# Patient Record
Sex: MALE | Race: WHITE | NOT HISPANIC OR LATINO | Employment: FULL TIME | URBAN - NONMETROPOLITAN AREA
[De-identification: names, ages, dates, MRNs, and addresses within clinical notes are randomized per-mention and may not be internally consistent; named-entity substitution may affect disease eponyms.]

---

## 2021-09-17 ENCOUNTER — TRANSCRIBE ORDERS (OUTPATIENT)
Dept: ADMINISTRATIVE | Facility: HOSPITAL | Age: 41
End: 2021-09-17

## 2021-09-17 ENCOUNTER — HOSPITAL ENCOUNTER (OUTPATIENT)
Dept: RESPIRATORY THERAPY | Facility: HOSPITAL | Age: 41
Discharge: HOME | End: 2021-09-17
Admitting: NURSE PRACTITIONER

## 2021-09-17 DIAGNOSIS — R94.31 ABNORMAL EKG: Primary | ICD-10-CM

## 2021-09-17 DIAGNOSIS — R94.31 ABNORMAL EKG: ICD-10-CM

## 2021-09-17 PROCEDURE — 93225 XTRNL ECG REC<48 HRS REC: CPT

## 2021-09-29 PROCEDURE — 93227 XTRNL ECG REC<48 HR R&I: CPT | Performed by: INTERNAL MEDICINE

## 2021-10-14 ENCOUNTER — OFFICE VISIT (OUTPATIENT)
Dept: CARDIOLOGY | Facility: CLINIC | Age: 41
End: 2021-10-14

## 2021-10-14 ENCOUNTER — PATIENT ROUNDING (BHMG ONLY) (OUTPATIENT)
Dept: CARDIOLOGY | Facility: CLINIC | Age: 41
End: 2021-10-14

## 2021-10-14 VITALS
BODY MASS INDEX: 25.61 KG/M2 | TEMPERATURE: 97.3 F | HEIGHT: 64 IN | DIASTOLIC BLOOD PRESSURE: 88 MMHG | OXYGEN SATURATION: 98 % | WEIGHT: 150 LBS | SYSTOLIC BLOOD PRESSURE: 122 MMHG

## 2021-10-14 DIAGNOSIS — R01.1 HEART MURMUR: ICD-10-CM

## 2021-10-14 DIAGNOSIS — R00.2 PALPITATIONS: Primary | ICD-10-CM

## 2021-10-14 DIAGNOSIS — Z91.89 MULTIPLE RISK FACTORS FOR CORONARY ARTERY DISEASE: ICD-10-CM

## 2021-10-14 DIAGNOSIS — R07.9 CHEST PAIN IN ADULT: ICD-10-CM

## 2021-10-14 PROCEDURE — 93000 ELECTROCARDIOGRAM COMPLETE: CPT | Performed by: INTERNAL MEDICINE

## 2021-10-14 PROCEDURE — 99204 OFFICE O/P NEW MOD 45 MIN: CPT | Performed by: INTERNAL MEDICINE

## 2021-10-14 RX ORDER — MELOXICAM 15 MG/1
15 TABLET ORAL AS NEEDED
COMMUNITY
Start: 2021-09-16

## 2021-10-14 RX ORDER — ROSUVASTATIN CALCIUM 10 MG/1
10 TABLET, COATED ORAL DAILY
COMMUNITY
Start: 2021-07-13

## 2021-10-14 RX ORDER — LOSARTAN POTASSIUM 50 MG/1
50 TABLET ORAL DAILY
COMMUNITY
Start: 2021-09-17

## 2021-10-14 RX ORDER — GABAPENTIN 100 MG/1
100 CAPSULE ORAL DAILY
COMMUNITY
Start: 2021-09-16

## 2021-10-14 RX ORDER — MULTIPLE VITAMINS W/ MINERALS TAB 9MG-400MCG
TAB ORAL SEE ADMIN INSTRUCTIONS
COMMUNITY

## 2021-10-14 RX ORDER — METFORMIN HYDROCHLORIDE 500 MG/1
500 TABLET, EXTENDED RELEASE ORAL 2 TIMES DAILY
COMMUNITY
Start: 2021-08-30

## 2021-10-14 NOTE — PROGRESS NOTES
Subjective   Chief Complaint   Patient presents with   • Chest Pain     Patient states that he has been having chest pain with pain radating down the left arm.   • Abnormal ECG     per PCP   • Palpitations     At night time        History of Present Illness  Patient is 41 years old white male who describes himself in good health however few years ago he had neck injury because some heavy object fell on his neck.  He is to see a neurologist but he stopped seeing him.  He complains of left arm hurting and numb there is pain in the left axilla also some pain in the left anterior chest close to the clavicle.    Pain is not related to exertion it is sharp and fleeting pain  He describes himself as physically active and healthy and has no exertional related chest pain.  He discussed that with his physician an EKG was found to be abnormal with junctional rhythm.  He also had a Holter monitor which did not show any significant arrhythmia.    Sometimes he gets palpitations but they occur at night and not related to activity.    He has history of hypertension on losartan therapy  Diabetes mellitus on Metformin and hyperlipidemia on Crestor.  Patient does not smoke and does not drink.  There is no history of rheumatic fever or heart murmur.    Risk factors include  Male gender  Hypertension  Hyperlipidemia  Diabetes mellitus    History reviewed. No pertinent surgical history.  Family History   Problem Relation Age of Onset   • Hypertension Mother    • Heart disease Mother    • Diabetes Mother    • Hypertension Father    • Heart disease Father    • Diabetes Father    • Hypertension Brother      Past Medical History:   Diagnosis Date   • Abnormal ECG    • Diabetes mellitus (HCC)    • Heart murmur 10/14/2021   • Hyperlipidemia    • Hypertension        Patient Active Problem List   Diagnosis   • Chest pain in adult   • Palpitations   • Heart murmur         Social History     Tobacco Use   • Smoking status: Never Smoker   •  "Smokeless tobacco: Never Used   Substance Use Topics   • Alcohol use: Never   • Drug use: Not on file         The following portions of the patient's history were reviewed and updated as appropriate: allergies, current medications, past family history, past medical history, past social history, past surgical history and problem list.    Allergies   Allergen Reactions   • Penicillins Other (See Comments)     Per mother when you child         Current Outpatient Medications:   •  gabapentin (NEURONTIN) 100 MG capsule, Take 100 mg by mouth Daily., Disp: , Rfl:   •  losartan (COZAAR) 50 MG tablet, Take 50 mg by mouth Daily., Disp: , Rfl:   •  meloxicam (MOBIC) 15 MG tablet, Take 15 mg by mouth As Needed., Disp: , Rfl:   •  metFORMIN ER (GLUCOPHAGE-XR) 500 MG 24 hr tablet, Take 500 mg by mouth 2 (Two) Times a Day., Disp: , Rfl:   •  multivitamin with minerals (MENS ONE DAILY PO), See Admin Instructions., Disp: , Rfl:   •  rosuvastatin (CRESTOR) 10 MG tablet, Take 10 mg by mouth Daily., Disp: , Rfl:     Review of Systems   Cardiovascular: Positive for chest pain and palpitations.   Musculoskeletal:        Left arm pain        Objective      /88 (BP Location: Left arm, Patient Position: Sitting, Cuff Size: Adult)   Temp 97.3 °F (36.3 °C)   Ht 162.6 cm (64\")   Wt 68 kg (150 lb)   SpO2 98%   BMI 25.75 kg/m²     Pulmonary:      Effort: Pulmonary effort is normal.      Breath sounds: Normal breath sounds. No stridor. No wheezing. No rhonchi. No rales.   Cardiovascular:      PMI at left midclavicular line. Normal rate. Regular rhythm. Normal S1. Normal S2.      Murmurs: There is a grade 2/4 high frequency blowing decrescendo, early diastolic murmur at the URSB.      No gallop. No click. No rub.   Pulses:     Intact distal pulses.   Edema:     Peripheral edema absent.         Lab Review:        ECG 12 Lead    Date/Time: 10/14/2021 5:18 PM  Performed by: Cande Santos MD  Authorized by: Cande Santos, " MD   Comparison: not compared with previous ECG   Previous ECG: no previous ECG available  Rhythm: sinus rhythm  Rate: normal  BPM: 72  Conduction: conduction normal  ST Segments: ST segments normal  T Waves: T waves normal  QRS axis: normal    Clinical impression: normal ECG        There is an EKG faxed from PCP which appears to have junctional rhythm but fax quality is extremely poor.    Holter monitor shows periods of junctional rhythm    I reviewed the patient's new clinical results.  I personally viewed and interpreted the patient's EKG/lab data        Assessment:   Diagnosis Plan   1. Palpitations  Adult Transthoracic Echo Complete W/ Cont if Necessary Per Protocol    Treadmill Stress Test    ECG 12 Lead   2. Heart murmur  Adult Transthoracic Echo Complete W/ Cont if Necessary Per Protocol    ECG 12 Lead   3. Chest pain in adult  Treadmill Stress Test    ECG 12 Lead          Plan:  Patient is 41 years old white male who is here for cardiac evaluation because of palpitations and chest pain.  Chest pain is quite atypical and probably noncardiac    EKG today is normal however Holter monitor shows junctional rhythm but no significant arrhythmia.    Patient has multiple risk factors for coronary artery disease however I do not feel the patient has any significant coronary artery disease at this time.  Aggressive risk factor modification emphasized.  We will check treadmill stress test for evaluation of arrhythmia and for chest pain.    Echocardiogram was arranged for evaluation of heart murmur.  Patient does seem to have a subtle diastolic murmur at the aortic area.    Blood pressure appears to be very well controlled with losartan.  Lipid control is also good with Crestor.  Patient will be reevaluated after studies have been completed.      Thank you for giving me the oppertunity to participate in your patient's cardiac care.    Sincerely,    FIDEL Santos M.D. [facility]     No follow-ups on file.

## 2021-10-14 NOTE — PROGRESS NOTES
"October 14, 2021    Hello, may I speak with Lavelle Storey?    My name is Reji Grant      I am  with [facility]  [address] [place] [address] [phone].    Before we get started may I verify your date of birth? 1980    I am calling to officially welcome you to our practice and ask about your recent visit. Is this a good time to talk? yes    Tell me about your visit with us. What things went well?  \"I was pleased with my visit. I thought everything went great and I really liked Dr. Santos\".        We're always looking for ways to make our patients' experiences even better. Do you have recommendations on ways we may improve?  no    Overall were you satisfied with your first visit to our practice? yes       I appreciate you taking the time to speak with me today. Is there anything else I can do for you? no      Thank you, and have a great day.      "

## 2021-10-27 ENCOUNTER — PATIENT ROUNDING (BHMG ONLY) (OUTPATIENT)
Dept: CARDIOLOGY | Facility: CLINIC | Age: 41
End: 2021-10-27

## 2021-10-27 NOTE — PROGRESS NOTES
"October 27, 2021    Hello, may I speak with Lavelle Storey?    My name is Damari Russo.    I am  with [facility]  [facility]  [address] [place] [address] [phone].    Before we get started may I verify your date of birth? 1980    I am calling to officially welcome you to our practice and ask about your recent visit. Is this a good time to talk? yes    Tell me about your visit with us. What things went well?     \"Everything was fine.  It went good.\"       We're always looking for ways to make our patients' experiences even better. Do you have recommendations on ways we may improve?  no    Overall were you satisfied with your first visit to our practice? yes       I appreciate you taking the time to speak with me today. Is there anything else I can do for you? no      Thank you, and have a great day.      "

## 2021-10-28 ENCOUNTER — APPOINTMENT (OUTPATIENT)
Dept: CARDIOLOGY | Facility: HOSPITAL | Age: 41
End: 2021-10-28

## 2022-01-05 ENCOUNTER — HOSPITAL ENCOUNTER (OUTPATIENT)
Dept: CARDIOLOGY | Facility: HOSPITAL | Age: 42
Discharge: HOME | End: 2022-01-05

## 2022-01-05 DIAGNOSIS — R07.9 CHEST PAIN IN ADULT: ICD-10-CM

## 2022-01-05 DIAGNOSIS — R00.2 PALPITATIONS: ICD-10-CM

## 2022-01-05 DIAGNOSIS — R01.1 HEART MURMUR: ICD-10-CM

## 2022-01-05 PROCEDURE — 93306 TTE W/DOPPLER COMPLETE: CPT | Performed by: INTERNAL MEDICINE

## 2022-01-05 PROCEDURE — 93306 TTE W/DOPPLER COMPLETE: CPT

## 2022-01-05 PROCEDURE — 93018 CV STRESS TEST I&R ONLY: CPT | Performed by: INTERNAL MEDICINE

## 2022-01-05 PROCEDURE — 93017 CV STRESS TEST TRACING ONLY: CPT

## 2022-01-06 LAB
BH CV ECHO MEAS - AO ROOT AREA (BSA CORRECTED): 1.7
BH CV ECHO MEAS - AO ROOT AREA: 7.1 CM^2
BH CV ECHO MEAS - AO ROOT DIAM: 3 CM
BH CV ECHO MEAS - BSA(HAYCOCK): 1.8 M^2
BH CV ECHO MEAS - BSA: 1.7 M^2
BH CV ECHO MEAS - BZI_BMI: 25.7 KILOGRAMS/M^2
BH CV ECHO MEAS - BZI_METRIC_HEIGHT: 162.6 CM
BH CV ECHO MEAS - BZI_METRIC_WEIGHT: 68 KG
BH CV ECHO MEAS - EDV(CUBED): 74.1 ML
BH CV ECHO MEAS - EDV(MOD-SP4): 43.4 ML
BH CV ECHO MEAS - EDV(TEICH): 78.6 ML
BH CV ECHO MEAS - EF(CUBED): 67.1 %
BH CV ECHO MEAS - EF(MOD-SP4): 56.9 %
BH CV ECHO MEAS - EF(TEICH): 59 %
BH CV ECHO MEAS - ESV(CUBED): 24.4 ML
BH CV ECHO MEAS - ESV(MOD-SP4): 18.7 ML
BH CV ECHO MEAS - ESV(TEICH): 32.2 ML
BH CV ECHO MEAS - FS: 31 %
BH CV ECHO MEAS - IVS/LVPW: 0.92
BH CV ECHO MEAS - IVSD: 1.1 CM
BH CV ECHO MEAS - LA DIMENSION: 3.1 CM
BH CV ECHO MEAS - LA/AO: 1
BH CV ECHO MEAS - LV DIASTOLIC VOL/BSA (35-75): 25.1 ML/M^2
BH CV ECHO MEAS - LV MASS(C)D: 167.4 GRAMS
BH CV ECHO MEAS - LV MASS(C)DI: 96.7 GRAMS/M^2
BH CV ECHO MEAS - LV SYSTOLIC VOL/BSA (12-30): 10.8 ML/M^2
BH CV ECHO MEAS - LVIDD: 4.2 CM
BH CV ECHO MEAS - LVIDS: 2.9 CM
BH CV ECHO MEAS - LVLD AP4: 7 CM
BH CV ECHO MEAS - LVLS AP4: 5.7 CM
BH CV ECHO MEAS - LVOT AREA (M): 2.8 CM^2
BH CV ECHO MEAS - LVOT AREA: 2.8 CM^2
BH CV ECHO MEAS - LVOT DIAM: 1.9 CM
BH CV ECHO MEAS - LVPWD: 1.2 CM
BH CV ECHO MEAS - MV A MAX VEL: 70.7 CM/SEC
BH CV ECHO MEAS - MV E MAX VEL: 86.5 CM/SEC
BH CV ECHO MEAS - MV E/A: 1.2
BH CV ECHO MEAS - PA ACC TIME: 0.1 SEC
BH CV ECHO MEAS - PA PR(ACCEL): 34.5 MMHG
BH CV ECHO MEAS - RAP SYSTOLE: 10 MMHG
BH CV ECHO MEAS - RVSP: 23.8 MMHG
BH CV ECHO MEAS - SI(CUBED): 28.7 ML/M^2
BH CV ECHO MEAS - SI(MOD-SP4): 14.3 ML/M^2
BH CV ECHO MEAS - SI(TEICH): 26.8 ML/M^2
BH CV ECHO MEAS - SV(CUBED): 49.7 ML
BH CV ECHO MEAS - SV(MOD-SP4): 24.7 ML
BH CV ECHO MEAS - SV(TEICH): 46.4 ML
BH CV ECHO MEAS - TR MAX VEL: 186 CM/SEC
BH CV STRESS BP STAGE 1: NORMAL
BH CV STRESS BP STAGE 2: NORMAL
BH CV STRESS BP STAGE 3: NORMAL
BH CV STRESS DURATION MIN STAGE 1: 3
BH CV STRESS DURATION MIN STAGE 2: 3
BH CV STRESS DURATION MIN STAGE 3: 3
BH CV STRESS DURATION MIN STAGE 4: 0
BH CV STRESS DURATION SEC STAGE 1: 0
BH CV STRESS DURATION SEC STAGE 2: 0
BH CV STRESS DURATION SEC STAGE 3: 0
BH CV STRESS DURATION SEC STAGE 4: 17
BH CV STRESS GRADE STAGE 1: 10
BH CV STRESS GRADE STAGE 2: 12
BH CV STRESS GRADE STAGE 3: 14
BH CV STRESS GRADE STAGE 4: 16
BH CV STRESS HR STAGE 1: 118
BH CV STRESS HR STAGE 2: 132
BH CV STRESS HR STAGE 3: 155
BH CV STRESS HR STAGE 4: 160
BH CV STRESS METS STAGE 1: 5
BH CV STRESS METS STAGE 2: 7.5
BH CV STRESS METS STAGE 3: 10
BH CV STRESS METS STAGE 4: 13.5
BH CV STRESS PROTOCOL 1: NORMAL
BH CV STRESS RECOVERY BP: NORMAL MMHG
BH CV STRESS RECOVERY HR: 101 BPM
BH CV STRESS SPEED STAGE 1: 1.7
BH CV STRESS SPEED STAGE 2: 2.5
BH CV STRESS SPEED STAGE 3: 3.4
BH CV STRESS SPEED STAGE 4: 4.2
BH CV STRESS STAGE 1: 1
BH CV STRESS STAGE 2: 2
BH CV STRESS STAGE 3: 3
BH CV STRESS STAGE 4: 4
MAXIMAL PREDICTED HEART RATE: 181 BPM
MAXIMAL PREDICTED HEART RATE: 181 BPM
PERCENT MAX PREDICTED HR: 88.4 %
STRESS BASELINE BP: NORMAL MMHG
STRESS BASELINE HR: 85 BPM
STRESS PERCENT HR: 104 %
STRESS POST ESTIMATED WORKLOAD: 10.1 METS
STRESS POST EXERCISE DUR MIN: 9 MIN
STRESS POST EXERCISE DUR SEC: 17 SEC
STRESS POST PEAK BP: NORMAL MMHG
STRESS POST PEAK HR: 160 BPM
STRESS TARGET HR: 154 BPM
STRESS TARGET HR: 154 BPM

## 2022-01-12 ENCOUNTER — OFFICE VISIT (OUTPATIENT)
Dept: CARDIOLOGY | Facility: CLINIC | Age: 42
End: 2022-01-12

## 2022-01-12 VITALS
TEMPERATURE: 97.1 F | BODY MASS INDEX: 25.78 KG/M2 | SYSTOLIC BLOOD PRESSURE: 116 MMHG | OXYGEN SATURATION: 98 % | DIASTOLIC BLOOD PRESSURE: 82 MMHG | HEIGHT: 64 IN | WEIGHT: 151 LBS

## 2022-01-12 DIAGNOSIS — R00.2 PALPITATIONS: Primary | ICD-10-CM

## 2022-01-12 DIAGNOSIS — I10 PRIMARY HYPERTENSION: ICD-10-CM

## 2022-01-12 DIAGNOSIS — E78.2 MIXED HYPERLIPIDEMIA: ICD-10-CM

## 2022-01-12 DIAGNOSIS — Z91.89 MULTIPLE RISK FACTORS FOR CORONARY ARTERY DISEASE: ICD-10-CM

## 2022-01-12 PROCEDURE — 99214 OFFICE O/P EST MOD 30 MIN: CPT | Performed by: INTERNAL MEDICINE

## 2022-01-12 RX ORDER — METOPROLOL SUCCINATE 25 MG/1
25 TABLET, EXTENDED RELEASE ORAL DAILY
Qty: 90 TABLET | Refills: 1 | Status: SHIPPED | OUTPATIENT
Start: 2022-01-12 | End: 2022-06-07

## 2022-01-12 NOTE — PROGRESS NOTES
subjective     Chief Complaint   Patient presents with   • Palpitations     follow up   • Results     stress test and echo   • Rapid Heart Rate     patient states he has had two episodes along with dizziness since last visit     History of Present Illness  Patient is 41 years old white male who states that he has been having a lot of palpitations, heartbeats fast but stays regular.  he had a Holter monitor done a few months ago which showed.  Of sinus tachycardia but no A. fib or SVT.    Stress test was negative for significant exercise-induced myocardial ischemia and echo was normal.  Patient was discussed the results.    Hyperlipidemia on Crestor therapy    Hypertension on losartan  Hyperglycemia on metformin therapy.  Patient has multiple risk factors for coronary artery disease but denies any cardiac symptoms except for fast heartbeat.    History reviewed. No pertinent surgical history.  Family History   Problem Relation Age of Onset   • Hypertension Mother    • Heart disease Mother    • Diabetes Mother    • Hypertension Father    • Heart disease Father    • Diabetes Father    • Hypertension Brother      Past Medical History:   Diagnosis Date   • Abnormal ECG    • Diabetes mellitus (HCC)    • Heart murmur 10/14/2021   • Hyperlipidemia    • Hypertension      Patient Active Problem List   Diagnosis   • Chest pain in adult   • Palpitations   • Heart murmur   • Multiple risk factors for coronary artery disease, hyperlipidemia, hypertension, diabetes mellitus   • Mixed hyperlipidemia   • Primary hypertension       Social History     Tobacco Use   • Smoking status: Never Smoker   • Smokeless tobacco: Never Used   Substance Use Topics   • Alcohol use: Never   • Drug use: Not on file       Allergies   Allergen Reactions   • Penicillins Other (See Comments)     Per mother when you child       Current Outpatient Medications on File Prior to Visit   Medication Sig   • gabapentin (NEURONTIN) 100 MG capsule Take 100 mg by  "mouth Daily.   • losartan (COZAAR) 50 MG tablet Take 50 mg by mouth Daily.   • meloxicam (MOBIC) 15 MG tablet Take 15 mg by mouth As Needed.   • metFORMIN ER (GLUCOPHAGE-XR) 500 MG 24 hr tablet Take 500 mg by mouth 2 (Two) Times a Day.   • multivitamin with minerals (MENS ONE DAILY PO) See Admin Instructions.   • rosuvastatin (CRESTOR) 10 MG tablet Take 10 mg by mouth Daily.     No current facility-administered medications on file prior to visit.         The following portions of the patient's history were reviewed and updated as appropriate: allergies, current medications, past family history, past medical history, past social history, past surgical history and problem list.    Review of Systems   Constitutional: Negative.   HENT: Negative.  Negative for congestion.    Eyes: Negative.    Cardiovascular: Positive for palpitations. Negative for chest pain, cyanosis, dyspnea on exertion, irregular heartbeat, leg swelling, near-syncope, orthopnea, paroxysmal nocturnal dyspnea and syncope.   Respiratory: Negative.  Negative for shortness of breath.    Hematologic/Lymphatic: Negative.    Musculoskeletal: Negative.    Gastrointestinal: Negative.    Neurological: Negative.  Negative for headaches.          Objective:     /82 (BP Location: Left arm, Patient Position: Sitting, Cuff Size: Adult)   Temp 97.1 °F (36.2 °C)   Ht 162.6 cm (64\")   Wt 68.5 kg (151 lb)   SpO2 98%   BMI 25.92 kg/m²   Pulmonary:      Effort: Pulmonary effort is normal.      Breath sounds: Normal breath sounds. No stridor. No wheezing. No rhonchi. No rales.   Cardiovascular:      PMI at left midclavicular line. Normal rate. Regular rhythm. Normal S1. Normal S2.      Murmurs: There is no murmur.      No gallop. No click. No rub.   Pulses:     Intact distal pulses.   Edema:     Peripheral edema absent.           Lab Review  No lab work this visit.  Procedures  Interpretation Summary treadmill stress test    · A stress test was performed " following the Catalino protocol.  · Resting EKG showed regular sinus rhythm rate of 85 bpm, nonspecific T wave changes were noted.  · Patient walked 9:17 on treadmill reaching target heart rate, patient denied any chest discomfort  · Blood pressure demonstrated a normal response to stress. Heart rate demonstrated a normal response to stress.  · ST segments did not show any diagnostic changes. No significant arrhythmia detected.  · The Duke Treadmill Score of 9.28 is consistent with a Low risk for ischemic heart disease.  · Stress test is felt to be negative for significant exercise-induced myocardial ischemia. Probability of significant coronary artery disease is low.    Interpretation Summary echocardiogram    · Normal left ventricular cavity size and wall thickness noted.  · Left ventricular ejection fraction appears to be 56 - 60%. Left ventricular systolic function is normal.  · Left ventricular diastolic function was normal.  · No significant valvular heart disease noted.  · No pericardial effusion detected.           I personally viewed and interpreted the patient's LAB data         Assessment:     1. Palpitations    2. Multiple risk factors for coronary artery disease, hyperlipidemia, hypertension, diabetes mellitus    3. Mixed hyperlipidemia    4. Primary hypertension          Plan:     Patient complains of palpitations but there is no evidence of atrial fibrillation or SVT.  He was advised to start Toprol-XL 25 mg daily  He has a history of hypertension and is taking losartan 50 mg daily blood pressure is 110 systolic.  He was advised to decrease losartan to 25 mg daily initially but after metoprolol initiation he can go back to losartan 50 as long as blood pressure is over 130 systolic.  Goal of blood pressure between 1 20-1 40 systolic discussed with the patient.    He also has hyperlipidemia and hyperglycemia with multiple risk factors for coronary artery disease.  Risk factor modification stressed.   Currently stress test and echocardiogram are both normal.  Follow-up in 6 months        No follow-ups on file.

## 2022-06-07 RX ORDER — METOPROLOL SUCCINATE 25 MG/1
TABLET, EXTENDED RELEASE ORAL
Qty: 90 TABLET | Refills: 0 | Status: SHIPPED | OUTPATIENT
Start: 2022-06-07

## 2022-06-08 ENCOUNTER — TRANSCRIBE ORDERS (OUTPATIENT)
Dept: ADMINISTRATIVE | Facility: HOSPITAL | Age: 42
End: 2022-06-08

## 2022-06-08 DIAGNOSIS — R19.09 GROIN SWELLING: Primary | ICD-10-CM

## 2022-06-18 ENCOUNTER — HOSPITAL ENCOUNTER (OUTPATIENT)
Dept: CT IMAGING | Facility: HOSPITAL | Age: 42
Discharge: HOME | End: 2022-06-18
Admitting: NURSE PRACTITIONER

## 2022-06-18 DIAGNOSIS — R19.09 GROIN SWELLING: ICD-10-CM

## 2022-06-18 PROCEDURE — 74160 CT ABDOMEN W/CONTRAST: CPT | Performed by: RADIOLOGY

## 2022-06-18 PROCEDURE — 25010000002 IOPAMIDOL 61 % SOLUTION: Performed by: NURSE PRACTITIONER

## 2022-06-18 PROCEDURE — 82565 ASSAY OF CREATININE: CPT

## 2022-06-18 PROCEDURE — 74160 CT ABDOMEN W/CONTRAST: CPT

## 2022-06-18 RX ADMIN — IOPAMIDOL 80 ML: 612 INJECTION, SOLUTION INTRAVENOUS at 14:59

## 2022-07-05 LAB — CREAT BLDA-MCNC: 0.6 MG/DL (ref 0.6–1.3)

## 2023-10-20 ENCOUNTER — TRANSCRIBE ORDERS (OUTPATIENT)
Dept: ADMINISTRATIVE | Facility: HOSPITAL | Age: 43
End: 2023-10-20
Payer: COMMERCIAL

## 2023-10-20 DIAGNOSIS — N20.0 KIDNEY STONE: Primary | ICD-10-CM

## 2023-11-08 ENCOUNTER — HOSPITAL ENCOUNTER (OUTPATIENT)
Dept: GENERAL RADIOLOGY | Facility: HOSPITAL | Age: 43
Discharge: HOME | End: 2023-11-08
Admitting: NURSE PRACTITIONER
Payer: COMMERCIAL

## 2023-11-08 ENCOUNTER — OFFICE VISIT (OUTPATIENT)
Dept: UROLOGY | Facility: CLINIC | Age: 43
End: 2023-11-08
Payer: COMMERCIAL

## 2023-11-08 VITALS
WEIGHT: 143.2 LBS | BODY MASS INDEX: 24.45 KG/M2 | SYSTOLIC BLOOD PRESSURE: 126 MMHG | HEART RATE: 88 BPM | HEIGHT: 64 IN | DIASTOLIC BLOOD PRESSURE: 87 MMHG

## 2023-11-08 DIAGNOSIS — R35.0 FREQUENCY OF MICTURITION: ICD-10-CM

## 2023-11-08 DIAGNOSIS — K59.04 CHRONIC IDIOPATHIC CONSTIPATION: ICD-10-CM

## 2023-11-08 DIAGNOSIS — R10.30 LOWER ABDOMINAL PAIN: ICD-10-CM

## 2023-11-08 DIAGNOSIS — N20.1 LEFT URETERAL STONE: Primary | ICD-10-CM

## 2023-11-08 DIAGNOSIS — R10.9 LEFT FLANK PAIN: ICD-10-CM

## 2023-11-08 LAB
BILIRUB BLD-MCNC: NEGATIVE MG/DL
CLARITY, POC: CLEAR
COLOR UR: YELLOW
EXPIRATION DATE: ABNORMAL
GLUCOSE UR STRIP-MCNC: NEGATIVE MG/DL
KETONES UR QL: NEGATIVE
LEUKOCYTE EST, POC: NEGATIVE
Lab: ABNORMAL
NITRITE UR-MCNC: NEGATIVE MG/ML
PH UR: 6 [PH] (ref 5–8)
PROT UR STRIP-MCNC: NEGATIVE MG/DL
RBC # UR STRIP: NEGATIVE /UL
SP GR UR: 1 (ref 1–1.03)
UROBILINOGEN UR QL: NORMAL

## 2023-11-08 PROCEDURE — 74018 RADEX ABDOMEN 1 VIEW: CPT

## 2023-11-08 PROCEDURE — 81003 URINALYSIS AUTO W/O SCOPE: CPT | Performed by: NURSE PRACTITIONER

## 2023-11-08 PROCEDURE — 74018 RADEX ABDOMEN 1 VIEW: CPT | Performed by: RADIOLOGY

## 2023-11-08 PROCEDURE — 99204 OFFICE O/P NEW MOD 45 MIN: CPT | Performed by: NURSE PRACTITIONER

## 2023-11-08 RX ORDER — HYDROCODONE BITARTRATE AND ACETAMINOPHEN 5; 325 MG/1; MG/1
2 TABLET ORAL EVERY 4 HOURS PRN
COMMUNITY

## 2023-11-08 RX ORDER — TAMSULOSIN HYDROCHLORIDE 0.4 MG/1
1 CAPSULE ORAL EVERY 24 HOURS
COMMUNITY
Start: 2023-10-20 | End: 2023-11-08 | Stop reason: SDUPTHER

## 2023-11-08 RX ORDER — GENTAMICIN SULFATE 80 MG/100ML
80 INJECTION, SOLUTION INTRAVENOUS ONCE
Status: CANCELLED | OUTPATIENT
Start: 2023-11-08 | End: 2023-11-08

## 2023-11-08 RX ORDER — TESTOSTERONE CYPIONATE 200 MG/ML
200 INJECTION, SOLUTION INTRAMUSCULAR
COMMUNITY
Start: 2023-10-28

## 2023-11-08 RX ORDER — KETOROLAC TROMETHAMINE 10 MG/1
10 TABLET, FILM COATED ORAL EVERY 6 HOURS PRN
COMMUNITY
Start: 2023-11-06

## 2023-11-08 RX ORDER — TAMSULOSIN HYDROCHLORIDE 0.4 MG/1
1 CAPSULE ORAL EVERY 24 HOURS
Qty: 30 CAPSULE | Refills: 1 | Status: SHIPPED | OUTPATIENT
Start: 2023-11-08

## 2023-11-08 NOTE — PROGRESS NOTES
Chief Complaint  LEFT RENAL STONE/FLANK PAIN (NEW PATIENT W.LEFT 4 MM DISTAL UVJ STONE)    Raul Storey presents to [facility] for FLANK PAIN/RENAL STONE  History of Present Illness    Mr. Lavelle Storey is a pleasant 43-year-old male with a significant history of recurrent kidney stones who returns to clinic today for evaluation as a new patient consult. The patient has been referred to clinic by PCP for kidney stones. He is also an ER follow-up. He reports he presented to [facility] ED on 11/7/2023 secondary to bilateral flank pain radiating to left lower back, left lower quadrant of his abdomen causing significant pelvic pressure and suprapubic discomfort. Patient did report his pain as colicky in nature, initially intermittent but had become sharp, constant causing nausea without vomiting, urinary symptoms, frequency, urgency and dysuria. He did have some bouts of hematuria,     AT the ER for evaluation, had a CT abdomen and pelvis completed which I have reviewed showing a 4 mm left distal ureteral stone, question mild to moderate hydronephrosis and hydroureter.    On initial evaluation in clinic today the patient is in apparent discomfort.  The patient is accompanied by his wife, Rosa. He has been experiencing left flank pain for approximately 3 weeks. He denies passing any stones. The emergency room informed him he had a 4 mm stone in his lower kidney. He does not have a gastroenterologist, but he was seen for a hernia. He has not had his hernia fixed. He denies any muscle pull or strain on his back. He had a back injury in the past. His pain is 3 or 4 out of 10.    His repeat KUB today 11/08/2023 is remarkable :IMPRESSION:  1.  Calcification in the left lower pelvis may represent distal ureter stone and is approximately 4.5 mm.  2. No additional renal or ureteral stones radiographically evident.  3.  Moderate to large volume colonic  "stool    CT IMPRESSION 11/07/23  4 x 3 mm left  distal ureteral stone with mild left hydroureteronephrosis.  No bilateral renal stones noted or right ureteral stone evident    Active Ambulatory Problems     Diagnosis Date Noted    Chest pain in adult 10/14/2021    Palpitations 10/14/2021    Heart murmur 10/14/2021    Multiple risk factors for coronary artery disease, hyperlipidemia, hypertension, diabetes mellitus 10/14/2021    Mixed hyperlipidemia 01/12/2022    Primary hypertension 01/12/2022    Left ureteral stone 11/08/2023    Left flank pain 11/08/2023    Lower abdominal pain 11/08/2023     Resolved Ambulatory Problems     Diagnosis Date Noted    No Resolved Ambulatory Problems     Past Medical History:   Diagnosis Date    Abnormal ECG     Diabetes mellitus     Hyperlipidemia     Hypertension       Objective   Vital Signs:   /87   Pulse 88   Ht 162.6 cm (64.02\")   Wt 65 kg (143 lb 3.2 oz)   BMI 24.57 kg/m²       ROS:   Review of Systems   Constitutional:  Positive for activity change, appetite change and fatigue. Negative for chills, diaphoresis, fever, unexpected weight gain and unexpected weight loss.   HENT: Negative.  Negative for congestion, ear discharge, ear pain, nosebleeds, rhinorrhea, sinus pressure and sore throat.    Eyes: Negative.  Negative for blurred vision, double vision, photophobia, pain, redness and visual disturbance.   Respiratory: Negative.  Negative for apnea, cough, chest tightness, shortness of breath, wheezing and stridor.    Cardiovascular: Negative.  Negative for chest pain and palpitations.   Gastrointestinal:  Positive for abdominal distention, abdominal pain, constipation and nausea. Negative for diarrhea and vomiting.   Endocrine: Negative.  Negative for polydipsia, polyphagia and polyuria.   Genitourinary:  Positive for difficulty urinating, dysuria, flank pain, frequency, hematuria, nocturia and urgency. Negative for decreased urine volume, discharge, genital sores, " penile pain, penile swelling, scrotal swelling, testicular pain and urinary incontinence.   Musculoskeletal:  Positive for back pain. Negative for arthralgias and joint swelling.   Skin:  Positive for color change and dry skin. Negative for pallor, rash and wound.   Allergic/Immunologic: Negative.    Neurological: Negative.  Negative for dizziness, tremors, syncope, weakness, light-headedness, memory problem and confusion.   Hematological: Negative.    Psychiatric/Behavioral:  Positive for sleep disturbance and stress. Negative for agitation, behavioral problems and decreased concentration.         Physical Exam  Constitutional:       General: He is in acute distress.      Appearance: He is well-developed.   HENT:      Head: Normocephalic and atraumatic.      Right Ear: External ear normal.      Left Ear: External ear normal.   Eyes:      General:         Right eye: No discharge.         Left eye: No discharge.      Conjunctiva/sclera: Conjunctivae normal.      Pupils: Pupils are equal, round, and reactive to light.   Neck:      Thyroid: No thyromegaly.      Trachea: No tracheal deviation.   Cardiovascular:      Rate and Rhythm: Normal rate and regular rhythm.      Heart sounds: No murmur heard.     No friction rub.   Pulmonary:      Effort: Pulmonary effort is normal. No respiratory distress.      Breath sounds: Normal breath sounds. No stridor.   Abdominal:      General: Bowel sounds are normal. There is distension.      Palpations: Abdomen is soft.      Tenderness: There is abdominal tenderness. There is guarding (LLQ/LEFT FLANK).   Genitourinary:     Penis: Normal and uncircumcised. No tenderness or discharge.       Testes: Normal.      Rectum: Normal. Guaiac result negative.      Comments: DYSURIA/FREQUENCY    Musculoskeletal:         General: Tenderness present. No deformity. Normal range of motion.      Cervical back: Normal range of motion and neck supple.   Skin:     General: Skin is warm and dry.       Capillary Refill: Capillary refill takes less than 2 seconds.      Coloration: Skin is pale.   Neurological:      Mental Status: He is alert and oriented to person, place, and time.      Cranial Nerves: No cranial nerve deficit.      Motor: Weakness present.      Coordination: Coordination normal.   Psychiatric:         Behavior: Behavior normal.         Thought Content: Thought content normal.         Judgment: Judgment normal.        Result Review :              Assessment and Plan    Problem List Items Addressed This Visit          Gastrointestinal Abdominal     Left flank pain    Relevant Medications    linaclotide (Linzess) 72 MCG capsule capsule    Other Relevant Orders    XR abdomen kub (Completed)    Case Request (Completed)    Lower abdominal pain    Relevant Medications    linaclotide (Linzess) 72 MCG capsule capsule    tamsulosin (FLOMAX) 0.4 MG capsule 24 hr capsule    Other Relevant Orders    Case Request (Completed)       Genitourinary and Reproductive     Left ureteral stone - Primary    Relevant Medications    tamsulosin (FLOMAX) 0.4 MG capsule 24 hr capsule    Other Relevant Orders    XR abdomen kub (Completed)    Case Request (Completed)     Other Visit Diagnoses       Frequency of micturition        Relevant Orders    POC Urinalysis Dipstick, Automated (Completed)    Chronic idiopathic constipation        Relevant Medications    linaclotide (Linzess) 72 MCG capsule capsule           ASSESSMENT     Left Flank/LLQ ABDOMINAL Pain / Left Ureteral Calculus/IBC-C:   Mr. Lavelle Storey is a pleasant 43-year-old male with a significant history of recurrent kidney stones who presents to clinic today for evaluation secondary to 4.5 mm left distal ureteral stone, causing moderate hydronephrosis and hydroureter.  On initial evaluation in clinic today he is in apparent discomfort reports persistent left flank/left lower quadrant abdominal pain that has been constant, with increased difficulty maintaining any  comfortable sitting or lying position.  He is unable to give us a urine for analysis today but reports dysuria, frequency, and constant feeling of incomplete bladder emptying. REPEAT KUB/CT scan is remarkable for 4.5 mm left distal ureteral stone.    The Patient has been diagnosed with a  4mm left ureteral calculus.. He has some discomfort and describes her pain as colicky,AND INtolerable at times. We have discussed the various parameters regarding spontaneous passage including the notion that a tiny 1-4 mm stone has a 95% high likelihood of spontaneous passage versus a larger stone being caught up in the upper areas of the urinary tract.  We also discussed the medical management of stone disease and the use of medical expulsive therapy in the form of Flomax. This is used in an off label setting. I also talked about nonoperative management including ambulation and increasing fluids to atleast 2-3L,  and hot tubs as being an effective adjuncts in the treatment of a ureteral stone. We discussed the absolute relative indicators for intervention including the presence of sepsis, and pain we cannot control as the primary need for urgent intervention.  We discussed placement of a stent if indicated and the management of the stent as well.    IBS- FINALLY, Patient has significant irritable bowel syndrome, characterized by extreme amounts of constipation, most likely narcotic pain control induced from his kidney stones.  We spoke about the impact of this on bladder function, and the flank pain, left lower quadrant abdominal pain he is experiencing.  We discussed the need for increasing p.o. fluid intake to at least 2 to 3 L of water daily, and discussed the physiology of colonic motility as well as use of MiraLAX as a bulk laxative versus the newer class of serotonin uptake blockers such as Linzess.  We stressed the need for a daily bowel movement and  We also discussed a referral to the GI nurse practitioner if his  constipation persist beyond his kidney stones.     PLAN  Patient has been scheduled for LEFT URETEROSCOPY LASER LITHOTRIPSY WITH POSSIBLE LEFT STENT ON MONDAY 11/13/2023    Patient has adequate Narcotic pain medication FROM ED HYDRO FOR SEVERE PAIN ,  just in case and Nausea medication and Flomax for medical expulsive therapy.      He is to use Toradol 10 as needed for breakthrough pain OR MELOXICAM PRN    LINZESS 72 MG SAMPLES GIVEN TO TAKE FOR SEVERE CONSTIPATION     We discussed OTHER  treatment options for HIS BACK/FLANK pain with patient encouraged to continue conservative therapy alternating NSAID/Tylenol as tolerated, Also including hot baths, showers, warm compresses to lower back as tolerated. Also encouraged walking, physical therapy, light exercises as tolerated    Rest is the most important treatment in the case of BACK/FLANK pain. Rest and physical therapy are enough to improve minor pain. Discussed to monitor his daily routine with prevention of flank pain by: At least Drinking 8 glasses of water per day, Limiting your alcohol consumption.  Having a healthy diet containing fruits, vegetables, and a lot of fluids, Practicing safe sex.  Also maintaining proper hygiene of your body as well as the environment.    Discussed a kidney stone prevention diet to include increasing p.o. fluid intake, to at least 1 to 2 L of water daily.  She is to avoid caffeine products such as cola, coffee, and to avoid soft or soda drinks.  She is to decrease her sodium consumption as in  Fast foods, menezes, salted nuts, canned foods, and smoked/cured foods. She is also to decrease her oxalate consumption, as in spinach, Roselia greens, and Rhubarb.  Also important is to decrease protein intake, as in red meat/s, peanut butter, and also avoid nuts.    I will follow up with patient post procedure    HE MAY RETURN SOONER IF NEED BE,    OR GO TO ER IF WORSENING SYMPTOMS    PATIENT WIFE AGREEABLE WITH PLAN OF CARE    Patient  reports that he is not currently experiencing any symptoms of urinary incontinence.    BMI is within normal parameters. No other follow-up for BMI required.    RADIOLOGY (CT AND/OR KUB):    CT Abdomen and Pelvis: No results found for this or any previous visit.     CT Stone Protocol: No results found for this or any previous visit.     KUB: No results found for this or any previous visit.       LABS (3 MONTHS):    Office Visit on 11/08/2023   Component Date Value Ref Range Status    Color 11/08/2023 Yellow  Yellow, Straw, Dark Yellow, Pilar Final    Clarity, UA 11/08/2023 Clear  Clear Final    Specific Gravity  11/08/2023 1.000 (A)  1.005 - 1.030 Final    pH, Urine 11/08/2023 6.0  5.0 - 8.0 Final    Leukocytes 11/08/2023 Negative  Negative Final    Nitrite, UA 11/08/2023 Negative  Negative Final    Protein, POC 11/08/2023 Negative  Negative mg/dL Final    Glucose, UA 11/08/2023 Negative  Negative mg/dL Final    Ketones, UA 11/08/2023 Negative  Negative Final    Urobilinogen, UA 11/08/2023 Normal  Normal, 0.2 E.U./dL Final    Bilirubin 11/08/2023 Negative  Negative Final    Blood, UA 11/08/2023 Negative  Negative Final    Lot Number 11/08/2023 [id number]   Final    Expiration Date 11/08/2023 10/25/2024   Final        Smoking Cessation Counseling:  Never a smoker.  Patient does not currently use any tobacco products.     Follow Up   Return in about 5 days (around 11/13/2023) for Next scheduled follow up, With Dr. Eldridge, Cystoscopy IN OR -URETEROSCOPY LASER LITHO FOR  4MM UVJ .    Patient was given instructions and counseling regarding his condition or for health maintenance advice. Please see specific information pulled into the AVS if appropriate.          This document has been electronically signed by Griselda Cheng-Akwa, APRN   November 8, 2023 17:42 EST      Dictated Utilizing Dragon Dictation: Part of this note may be an electronic transcription/translation of spoken language to printed text using the  Dragon Dictation System.     Transcribed from ambient dictation for Griselda Cheng-Akwa, APRN by Inga Monique.  11/08/23   12:38 EST    Patient or patient representative verbalized consent to the visit recording.  I have personally performed the services described in this document as transcribed by the above individual, and it is both accurate and complete.

## 2023-11-08 NOTE — H&P (VIEW-ONLY)
Chief Complaint  LEFT RENAL STONE/FLANK PAIN (NEW PATIENT W.LEFT 4 MM DISTAL UVJ STONE)    Raul Storey presents to [facility] for FLANK PAIN/RENAL STONE  History of Present Illness    Mr. Lavelle Storey is a pleasant 43-year-old male with a significant history of recurrent kidney stones who returns to clinic today for evaluation as a new patient consult. The patient has been referred to clinic by PCP for kidney stones. He is also an ER follow-up. He reports he presented to [facility] ED on 11/7/2023 secondary to bilateral flank pain radiating to left lower back, left lower quadrant of his abdomen causing significant pelvic pressure and suprapubic discomfort. Patient did report his pain as colicky in nature, initially intermittent but had become sharp, constant causing nausea without vomiting, urinary symptoms, frequency, urgency and dysuria. He did have some bouts of hematuria,     AT the ER for evaluation, had a CT abdomen and pelvis completed which I have reviewed showing a 4 mm left distal ureteral stone, question mild to moderate hydronephrosis and hydroureter.    On initial evaluation in clinic today the patient is in apparent discomfort.  The patient is accompanied by his wife, Rosa. He has been experiencing left flank pain for approximately 3 weeks. He denies passing any stones. The emergency room informed him he had a 4 mm stone in his lower kidney. He does not have a gastroenterologist, but he was seen for a hernia. He has not had his hernia fixed. He denies any muscle pull or strain on his back. He had a back injury in the past. His pain is 3 or 4 out of 10.    His repeat KUB today 11/08/2023 is remarkable :IMPRESSION:  1.  Calcification in the left lower pelvis may represent distal ureter stone and is approximately 4.5 mm.  2. No additional renal or ureteral stones radiographically evident.  3.  Moderate to large volume colonic  "stool    CT IMPRESSION 11/07/23  4 x 3 mm left  distal ureteral stone with mild left hydroureteronephrosis.  No bilateral renal stones noted or right ureteral stone evident    Active Ambulatory Problems     Diagnosis Date Noted    Chest pain in adult 10/14/2021    Palpitations 10/14/2021    Heart murmur 10/14/2021    Multiple risk factors for coronary artery disease, hyperlipidemia, hypertension, diabetes mellitus 10/14/2021    Mixed hyperlipidemia 01/12/2022    Primary hypertension 01/12/2022    Left ureteral stone 11/08/2023    Left flank pain 11/08/2023    Lower abdominal pain 11/08/2023     Resolved Ambulatory Problems     Diagnosis Date Noted    No Resolved Ambulatory Problems     Past Medical History:   Diagnosis Date    Abnormal ECG     Diabetes mellitus     Hyperlipidemia     Hypertension       Objective   Vital Signs:   /87   Pulse 88   Ht 162.6 cm (64.02\")   Wt 65 kg (143 lb 3.2 oz)   BMI 24.57 kg/m²       ROS:   Review of Systems   Constitutional:  Positive for activity change, appetite change and fatigue. Negative for chills, diaphoresis, fever, unexpected weight gain and unexpected weight loss.   HENT: Negative.  Negative for congestion, ear discharge, ear pain, nosebleeds, rhinorrhea, sinus pressure and sore throat.    Eyes: Negative.  Negative for blurred vision, double vision, photophobia, pain, redness and visual disturbance.   Respiratory: Negative.  Negative for apnea, cough, chest tightness, shortness of breath, wheezing and stridor.    Cardiovascular: Negative.  Negative for chest pain and palpitations.   Gastrointestinal:  Positive for abdominal distention, abdominal pain, constipation and nausea. Negative for diarrhea and vomiting.   Endocrine: Negative.  Negative for polydipsia, polyphagia and polyuria.   Genitourinary:  Positive for difficulty urinating, dysuria, flank pain, frequency, hematuria, nocturia and urgency. Negative for decreased urine volume, discharge, genital sores, " penile pain, penile swelling, scrotal swelling, testicular pain and urinary incontinence.   Musculoskeletal:  Positive for back pain. Negative for arthralgias and joint swelling.   Skin:  Positive for color change and dry skin. Negative for pallor, rash and wound.   Allergic/Immunologic: Negative.    Neurological: Negative.  Negative for dizziness, tremors, syncope, weakness, light-headedness, memory problem and confusion.   Hematological: Negative.    Psychiatric/Behavioral:  Positive for sleep disturbance and stress. Negative for agitation, behavioral problems and decreased concentration.         Physical Exam  Constitutional:       General: He is in acute distress.      Appearance: He is well-developed.   HENT:      Head: Normocephalic and atraumatic.      Right Ear: External ear normal.      Left Ear: External ear normal.   Eyes:      General:         Right eye: No discharge.         Left eye: No discharge.      Conjunctiva/sclera: Conjunctivae normal.      Pupils: Pupils are equal, round, and reactive to light.   Neck:      Thyroid: No thyromegaly.      Trachea: No tracheal deviation.   Cardiovascular:      Rate and Rhythm: Normal rate and regular rhythm.      Heart sounds: No murmur heard.     No friction rub.   Pulmonary:      Effort: Pulmonary effort is normal. No respiratory distress.      Breath sounds: Normal breath sounds. No stridor.   Abdominal:      General: Bowel sounds are normal. There is distension.      Palpations: Abdomen is soft.      Tenderness: There is abdominal tenderness. There is guarding (LLQ/LEFT FLANK).   Genitourinary:     Penis: Normal and uncircumcised. No tenderness or discharge.       Testes: Normal.      Rectum: Normal. Guaiac result negative.      Comments: DYSURIA/FREQUENCY    Musculoskeletal:         General: Tenderness present. No deformity. Normal range of motion.      Cervical back: Normal range of motion and neck supple.   Skin:     General: Skin is warm and dry.       Capillary Refill: Capillary refill takes less than 2 seconds.      Coloration: Skin is pale.   Neurological:      Mental Status: He is alert and oriented to person, place, and time.      Cranial Nerves: No cranial nerve deficit.      Motor: Weakness present.      Coordination: Coordination normal.   Psychiatric:         Behavior: Behavior normal.         Thought Content: Thought content normal.         Judgment: Judgment normal.        Result Review :              Assessment and Plan    Problem List Items Addressed This Visit          Gastrointestinal Abdominal     Left flank pain    Relevant Medications    linaclotide (Linzess) 72 MCG capsule capsule    Other Relevant Orders    XR abdomen kub (Completed)    Case Request (Completed)    Lower abdominal pain    Relevant Medications    linaclotide (Linzess) 72 MCG capsule capsule    tamsulosin (FLOMAX) 0.4 MG capsule 24 hr capsule    Other Relevant Orders    Case Request (Completed)       Genitourinary and Reproductive     Left ureteral stone - Primary    Relevant Medications    tamsulosin (FLOMAX) 0.4 MG capsule 24 hr capsule    Other Relevant Orders    XR abdomen kub (Completed)    Case Request (Completed)     Other Visit Diagnoses       Frequency of micturition        Relevant Orders    POC Urinalysis Dipstick, Automated (Completed)    Chronic idiopathic constipation        Relevant Medications    linaclotide (Linzess) 72 MCG capsule capsule           ASSESSMENT     Left Flank/LLQ ABDOMINAL Pain / Left Ureteral Calculus/IBC-C:   Mr. Lavelle Storey is a pleasant 43-year-old male with a significant history of recurrent kidney stones who presents to clinic today for evaluation secondary to 4.5 mm left distal ureteral stone, causing moderate hydronephrosis and hydroureter.  On initial evaluation in clinic today he is in apparent discomfort reports persistent left flank/left lower quadrant abdominal pain that has been constant, with increased difficulty maintaining any  comfortable sitting or lying position.  He is unable to give us a urine for analysis today but reports dysuria, frequency, and constant feeling of incomplete bladder emptying. REPEAT KUB/CT scan is remarkable for 4.5 mm left distal ureteral stone.    The Patient has been diagnosed with a  4mm left ureteral calculus.. He has some discomfort and describes her pain as colicky,AND INtolerable at times. We have discussed the various parameters regarding spontaneous passage including the notion that a tiny 1-4 mm stone has a 95% high likelihood of spontaneous passage versus a larger stone being caught up in the upper areas of the urinary tract.  We also discussed the medical management of stone disease and the use of medical expulsive therapy in the form of Flomax. This is used in an off label setting. I also talked about nonoperative management including ambulation and increasing fluids to atleast 2-3L,  and hot tubs as being an effective adjuncts in the treatment of a ureteral stone. We discussed the absolute relative indicators for intervention including the presence of sepsis, and pain we cannot control as the primary need for urgent intervention.  We discussed placement of a stent if indicated and the management of the stent as well.    IBS- FINALLY, Patient has significant irritable bowel syndrome, characterized by extreme amounts of constipation, most likely narcotic pain control induced from his kidney stones.  We spoke about the impact of this on bladder function, and the flank pain, left lower quadrant abdominal pain he is experiencing.  We discussed the need for increasing p.o. fluid intake to at least 2 to 3 L of water daily, and discussed the physiology of colonic motility as well as use of MiraLAX as a bulk laxative versus the newer class of serotonin uptake blockers such as Linzess.  We stressed the need for a daily bowel movement and  We also discussed a referral to the GI nurse practitioner if his  constipation persist beyond his kidney stones.     PLAN  Patient has been scheduled for LEFT URETEROSCOPY LASER LITHOTRIPSY WITH POSSIBLE LEFT STENT ON MONDAY 11/13/2023    Patient has adequate Narcotic pain medication FROM ED HYDRO FOR SEVERE PAIN ,  just in case and Nausea medication and Flomax for medical expulsive therapy.      He is to use Toradol 10 as needed for breakthrough pain OR MELOXICAM PRN    LINZESS 72 MG SAMPLES GIVEN TO TAKE FOR SEVERE CONSTIPATION     We discussed OTHER  treatment options for HIS BACK/FLANK pain with patient encouraged to continue conservative therapy alternating NSAID/Tylenol as tolerated, Also including hot baths, showers, warm compresses to lower back as tolerated. Also encouraged walking, physical therapy, light exercises as tolerated    Rest is the most important treatment in the case of BACK/FLANK pain. Rest and physical therapy are enough to improve minor pain. Discussed to monitor his daily routine with prevention of flank pain by: At least Drinking 8 glasses of water per day, Limiting your alcohol consumption.  Having a healthy diet containing fruits, vegetables, and a lot of fluids, Practicing safe sex.  Also maintaining proper hygiene of your body as well as the environment.    Discussed a kidney stone prevention diet to include increasing p.o. fluid intake, to at least 1 to 2 L of water daily.  She is to avoid caffeine products such as cola, coffee, and to avoid soft or soda drinks.  She is to decrease her sodium consumption as in  Fast foods, menezes, salted nuts, canned foods, and smoked/cured foods. She is also to decrease her oxalate consumption, as in spinach, Roselia greens, and Rhubarb.  Also important is to decrease protein intake, as in red meat/s, peanut butter, and also avoid nuts.    I will follow up with patient post procedure    HE MAY RETURN SOONER IF NEED BE,    OR GO TO ER IF WORSENING SYMPTOMS    PATIENT WIFE AGREEABLE WITH PLAN OF CARE    Patient  reports that he is not currently experiencing any symptoms of urinary incontinence.    BMI is within normal parameters. No other follow-up for BMI required.    RADIOLOGY (CT AND/OR KUB):    CT Abdomen and Pelvis: No results found for this or any previous visit.     CT Stone Protocol: No results found for this or any previous visit.     KUB: No results found for this or any previous visit.       LABS (3 MONTHS):    Office Visit on 11/08/2023   Component Date Value Ref Range Status    Color 11/08/2023 Yellow  Yellow, Straw, Dark Yellow, Pilar Final    Clarity, UA 11/08/2023 Clear  Clear Final    Specific Gravity  11/08/2023 1.000 (A)  1.005 - 1.030 Final    pH, Urine 11/08/2023 6.0  5.0 - 8.0 Final    Leukocytes 11/08/2023 Negative  Negative Final    Nitrite, UA 11/08/2023 Negative  Negative Final    Protein, POC 11/08/2023 Negative  Negative mg/dL Final    Glucose, UA 11/08/2023 Negative  Negative mg/dL Final    Ketones, UA 11/08/2023 Negative  Negative Final    Urobilinogen, UA 11/08/2023 Normal  Normal, 0.2 E.U./dL Final    Bilirubin 11/08/2023 Negative  Negative Final    Blood, UA 11/08/2023 Negative  Negative Final    Lot Number 11/08/2023 [id number]   Final    Expiration Date 11/08/2023 10/25/2024   Final        Smoking Cessation Counseling:  Never a smoker.  Patient does not currently use any tobacco products.     Follow Up   Return in about 5 days (around 11/13/2023) for Next scheduled follow up, With Dr. Eldridge, Cystoscopy IN OR -URETEROSCOPY LASER LITHO FOR  4MM UVJ .    Patient was given instructions and counseling regarding his condition or for health maintenance advice. Please see specific information pulled into the AVS if appropriate.          This document has been electronically signed by Griselda Cheng-Akwa, APRN   November 8, 2023 17:42 EST      Dictated Utilizing Dragon Dictation: Part of this note may be an electronic transcription/translation of spoken language to printed text using the  Dragon Dictation System.     Transcribed from ambient dictation for Griselda Cheng-Akwa, APRN by Inga Monique.  11/08/23   12:38 EST    Patient or patient representative verbalized consent to the visit recording.  I have personally performed the services described in this document as transcribed by the above individual, and it is both accurate and complete.

## 2023-11-13 ENCOUNTER — APPOINTMENT (OUTPATIENT)
Dept: OTHER | Facility: HOSPITAL | Age: 43
End: 2023-11-13
Payer: COMMERCIAL

## 2023-11-13 ENCOUNTER — APPOINTMENT (OUTPATIENT)
Dept: GENERAL RADIOLOGY | Facility: HOSPITAL | Age: 43
End: 2023-11-13
Payer: COMMERCIAL

## 2023-11-13 ENCOUNTER — HOSPITAL ENCOUNTER (OUTPATIENT)
Facility: HOSPITAL | Age: 43
Setting detail: HOSPITAL OUTPATIENT SURGERY
Discharge: HOME | End: 2023-11-13
Attending: UROLOGY | Admitting: UROLOGY
Payer: COMMERCIAL

## 2023-11-13 ENCOUNTER — ANESTHESIA EVENT (OUTPATIENT)
Dept: PERIOP | Facility: HOSPITAL | Age: 43
End: 2023-11-13
Payer: COMMERCIAL

## 2023-11-13 ENCOUNTER — ANESTHESIA (OUTPATIENT)
Dept: PERIOP | Facility: HOSPITAL | Age: 43
End: 2023-11-13
Payer: COMMERCIAL

## 2023-11-13 VITALS
RESPIRATION RATE: 14 BRPM | SYSTOLIC BLOOD PRESSURE: 115 MMHG | HEART RATE: 78 BPM | TEMPERATURE: 97.9 F | OXYGEN SATURATION: 96 % | WEIGHT: 139.4 LBS | HEIGHT: 64 IN | DIASTOLIC BLOOD PRESSURE: 72 MMHG | BODY MASS INDEX: 23.8 KG/M2

## 2023-11-13 DIAGNOSIS — R35.0 FREQUENCY OF MICTURITION: ICD-10-CM

## 2023-11-13 DIAGNOSIS — N20.1 LEFT URETERAL STONE: ICD-10-CM

## 2023-11-13 DIAGNOSIS — R10.30 LOWER ABDOMINAL PAIN: ICD-10-CM

## 2023-11-13 DIAGNOSIS — R10.9 LEFT FLANK PAIN: ICD-10-CM

## 2023-11-13 LAB — GLUCOSE BLDC GLUCOMTR-MCNC: 104 MG/DL (ref 70–130)

## 2023-11-13 PROCEDURE — 25010000002 ONDANSETRON PER 1 MG: Performed by: NURSE ANESTHETIST, CERTIFIED REGISTERED

## 2023-11-13 PROCEDURE — 82948 REAGENT STRIP/BLOOD GLUCOSE: CPT

## 2023-11-13 PROCEDURE — 52356 CYSTO/URETERO W/LITHOTRIPSY: CPT | Performed by: UROLOGY

## 2023-11-13 PROCEDURE — 25010000002 FENTANYL CITRATE (PF) 50 MCG/ML SOLUTION: Performed by: NURSE ANESTHETIST, CERTIFIED REGISTERED

## 2023-11-13 PROCEDURE — 25010000002 GENTAMICIN PER 80 MG: Performed by: NURSE PRACTITIONER

## 2023-11-13 PROCEDURE — 25810000003 LACTATED RINGERS PER 1000 ML: Performed by: ANESTHESIOLOGY

## 2023-11-13 PROCEDURE — 25010000002 MIDAZOLAM PER 1 MG: Performed by: NURSE ANESTHETIST, CERTIFIED REGISTERED

## 2023-11-13 PROCEDURE — 76000 FLUOROSCOPY <1 HR PHYS/QHP: CPT | Performed by: RADIOLOGY

## 2023-11-13 PROCEDURE — C2617 STENT, NON-COR, TEM W/O DEL: HCPCS | Performed by: UROLOGY

## 2023-11-13 PROCEDURE — C1769 GUIDE WIRE: HCPCS | Performed by: UROLOGY

## 2023-11-13 PROCEDURE — 74420 UROGRAPHY RTRGR +-KUB: CPT | Performed by: UROLOGY

## 2023-11-13 PROCEDURE — 76000 FLUOROSCOPY <1 HR PHYS/QHP: CPT

## 2023-11-13 PROCEDURE — 82365 CALCULUS SPECTROSCOPY: CPT | Performed by: UROLOGY

## 2023-11-13 DEVICE — URETERAL STENT
Type: IMPLANTABLE DEVICE | Site: URETER | Status: FUNCTIONAL
Brand: POLARIS™ ULTRA

## 2023-11-13 RX ORDER — MIDAZOLAM HYDROCHLORIDE 1 MG/ML
1 INJECTION INTRAMUSCULAR; INTRAVENOUS
Status: DISCONTINUED | OUTPATIENT
Start: 2023-11-13 | End: 2023-11-13 | Stop reason: HOSPADM

## 2023-11-13 RX ORDER — SODIUM CHLORIDE 9 MG/ML
40 INJECTION, SOLUTION INTRAVENOUS AS NEEDED
Status: DISCONTINUED | OUTPATIENT
Start: 2023-11-13 | End: 2023-11-13 | Stop reason: HOSPADM

## 2023-11-13 RX ORDER — FENTANYL CITRATE 50 UG/ML
50 INJECTION, SOLUTION INTRAMUSCULAR; INTRAVENOUS
Status: DISCONTINUED | OUTPATIENT
Start: 2023-11-13 | End: 2023-11-13 | Stop reason: HOSPADM

## 2023-11-13 RX ORDER — MAGNESIUM HYDROXIDE 1200 MG/15ML
LIQUID ORAL AS NEEDED
Status: DISCONTINUED | OUTPATIENT
Start: 2023-11-13 | End: 2023-11-13 | Stop reason: HOSPADM

## 2023-11-13 RX ORDER — SODIUM CHLORIDE, SODIUM LACTATE, POTASSIUM CHLORIDE, CALCIUM CHLORIDE 600; 310; 30; 20 MG/100ML; MG/100ML; MG/100ML; MG/100ML
100 INJECTION, SOLUTION INTRAVENOUS ONCE AS NEEDED
Status: DISCONTINUED | OUTPATIENT
Start: 2023-11-13 | End: 2023-11-13 | Stop reason: HOSPADM

## 2023-11-13 RX ORDER — SODIUM CHLORIDE 0.9 % (FLUSH) 0.9 %
10 SYRINGE (ML) INJECTION EVERY 12 HOURS SCHEDULED
Status: DISCONTINUED | OUTPATIENT
Start: 2023-11-13 | End: 2023-11-13 | Stop reason: HOSPADM

## 2023-11-13 RX ORDER — SODIUM CHLORIDE 0.9 % (FLUSH) 0.9 %
10 SYRINGE (ML) INJECTION AS NEEDED
Status: DISCONTINUED | OUTPATIENT
Start: 2023-11-13 | End: 2023-11-13 | Stop reason: HOSPADM

## 2023-11-13 RX ORDER — KETOROLAC TROMETHAMINE 30 MG/ML
30 INJECTION, SOLUTION INTRAMUSCULAR; INTRAVENOUS EVERY 6 HOURS PRN
Status: DISCONTINUED | OUTPATIENT
Start: 2023-11-13 | End: 2023-11-13 | Stop reason: HOSPADM

## 2023-11-13 RX ORDER — ONDANSETRON 2 MG/ML
4 INJECTION INTRAMUSCULAR; INTRAVENOUS AS NEEDED
Status: DISCONTINUED | OUTPATIENT
Start: 2023-11-13 | End: 2023-11-13 | Stop reason: HOSPADM

## 2023-11-13 RX ORDER — LIDOCAINE HYDROCHLORIDE 20 MG/ML
JELLY TOPICAL AS NEEDED
Status: DISCONTINUED | OUTPATIENT
Start: 2023-11-13 | End: 2023-11-13 | Stop reason: HOSPADM

## 2023-11-13 RX ORDER — TIRZEPATIDE 2.5 MG/.5ML
2.5 INJECTION, SOLUTION SUBCUTANEOUS
COMMUNITY

## 2023-11-13 RX ORDER — IPRATROPIUM BROMIDE AND ALBUTEROL SULFATE 2.5; .5 MG/3ML; MG/3ML
3 SOLUTION RESPIRATORY (INHALATION) ONCE AS NEEDED
Status: DISCONTINUED | OUTPATIENT
Start: 2023-11-13 | End: 2023-11-13 | Stop reason: HOSPADM

## 2023-11-13 RX ORDER — MEPERIDINE HYDROCHLORIDE 25 MG/ML
12.5 INJECTION INTRAMUSCULAR; INTRAVENOUS; SUBCUTANEOUS
Status: DISCONTINUED | OUTPATIENT
Start: 2023-11-13 | End: 2023-11-13 | Stop reason: HOSPADM

## 2023-11-13 RX ORDER — GENTAMICIN SULFATE 80 MG/100ML
80 INJECTION, SOLUTION INTRAVENOUS ONCE
Status: COMPLETED | OUTPATIENT
Start: 2023-11-13 | End: 2023-11-13

## 2023-11-13 RX ORDER — MIDAZOLAM HYDROCHLORIDE 1 MG/ML
INJECTION INTRAMUSCULAR; INTRAVENOUS AS NEEDED
Status: DISCONTINUED | OUTPATIENT
Start: 2023-11-13 | End: 2023-11-13 | Stop reason: SURG

## 2023-11-13 RX ORDER — OXYCODONE AND ACETAMINOPHEN 10; 325 MG/1; MG/1
1 TABLET ORAL EVERY 6 HOURS PRN
Qty: 12 TABLET | Refills: 0 | Status: SHIPPED | OUTPATIENT
Start: 2023-11-13

## 2023-11-13 RX ORDER — FAMOTIDINE 10 MG/ML
INJECTION, SOLUTION INTRAVENOUS AS NEEDED
Status: DISCONTINUED | OUTPATIENT
Start: 2023-11-13 | End: 2023-11-13 | Stop reason: SURG

## 2023-11-13 RX ORDER — OXYCODONE HYDROCHLORIDE AND ACETAMINOPHEN 5; 325 MG/1; MG/1
1 TABLET ORAL ONCE AS NEEDED
Status: DISCONTINUED | OUTPATIENT
Start: 2023-11-13 | End: 2023-11-13 | Stop reason: HOSPADM

## 2023-11-13 RX ORDER — SODIUM CHLORIDE, SODIUM LACTATE, POTASSIUM CHLORIDE, CALCIUM CHLORIDE 600; 310; 30; 20 MG/100ML; MG/100ML; MG/100ML; MG/100ML
125 INJECTION, SOLUTION INTRAVENOUS ONCE
Status: COMPLETED | OUTPATIENT
Start: 2023-11-13 | End: 2023-11-13

## 2023-11-13 RX ORDER — ONDANSETRON 2 MG/ML
INJECTION INTRAMUSCULAR; INTRAVENOUS AS NEEDED
Status: DISCONTINUED | OUTPATIENT
Start: 2023-11-13 | End: 2023-11-13 | Stop reason: SURG

## 2023-11-13 RX ORDER — FENTANYL CITRATE 50 UG/ML
INJECTION, SOLUTION INTRAMUSCULAR; INTRAVENOUS AS NEEDED
Status: DISCONTINUED | OUTPATIENT
Start: 2023-11-13 | End: 2023-11-13 | Stop reason: SURG

## 2023-11-13 RX ADMIN — GENTAMICIN SULFATE 80 MG: 80 INJECTION, SOLUTION INTRAVENOUS at 11:56

## 2023-11-13 RX ADMIN — ONDANSETRON 4 MG: 2 INJECTION INTRAMUSCULAR; INTRAVENOUS at 11:48

## 2023-11-13 RX ADMIN — SODIUM CHLORIDE, POTASSIUM CHLORIDE, SODIUM LACTATE AND CALCIUM CHLORIDE: 600; 310; 30; 20 INJECTION, SOLUTION INTRAVENOUS at 11:48

## 2023-11-13 RX ADMIN — MIDAZOLAM HYDROCHLORIDE 2 MG: 1 INJECTION, SOLUTION INTRAMUSCULAR; INTRAVENOUS at 11:48

## 2023-11-13 RX ADMIN — FAMOTIDINE 20 MG: 10 INJECTION, SOLUTION INTRAVENOUS at 11:48

## 2023-11-13 RX ADMIN — FENTANYL CITRATE 100 MCG: 50 INJECTION INTRAMUSCULAR; INTRAVENOUS at 11:48

## 2023-11-13 NOTE — ANESTHESIA POSTPROCEDURE EVALUATION
Patient: Lavelle Storey    Procedure Summary       Date: 11/13/23 Room / Location:  [facility] OR  /  [facility]    Anesthesia Start: 1149 Anesthesia Stop: 1229    Procedures:       URETEROSCOPY LASER LITHOTRIPSY (Left)      CYSTOSCOPY URETERAL STONE EXTRACTION (Left) Diagnosis:       Left ureteral stone      Left flank pain      Lower abdominal pain      (Left ureteral stone [N20.1])      (Left flank pain [R10.9])      (Lower abdominal pain [R10.30])    Surgeons: David Eldridge MD Provider: Tyler Mckenzie MD    Anesthesia Type: general with block ASA Status: 2            Anesthesia Type: general with block    Vitals  Vitals Value Taken Time   /75 11/13/23 1300   Temp 97 °F (36.1 °C) 11/13/23 1230   Pulse 76 11/13/23 1302   Resp 12 11/13/23 1300   SpO2 94 % 11/13/23 1302   Vitals shown include unfiled device data.        Post Anesthesia Care and Evaluation    Patient location during evaluation: PACU  Patient participation: complete - patient participated  Level of consciousness: awake  Pain score: 0  Pain management: satisfactory to patient    Airway patency: patent  Anesthetic complications: No anesthetic complications  PONV Status: none  Cardiovascular status: hemodynamically stable  Respiratory status: nasal cannula  Hydration status: acceptable

## 2023-11-13 NOTE — INTERVAL H&P NOTE
H&P reviewed. The patient was examined and there are no changes to the H&P.         LOV: 2/9/2023    Per ELISA's LOV note:      EMG done by Dr. Palacios 3/15/2023.    Left message for patient to call back.    Nurse Note:  When Sharon calls back please schedule her for a FUV with [facility] to go over EMG results and assess progress with Trileptal. Please then send this refill to [facility] to refill (not on protocol).

## 2023-11-13 NOTE — OP NOTE
Lavelle Raleigh  11/13/2023    Pre-op Diagnosis:   Left ureteral stone [N20.1]  Left flank pain [R10.9]  Lower abdominal pain [R10.30]    Post-op Diagnosis:     Post-Op Diagnosis Codes:     * Left ureteral stone [N20.1]     * Left flank pain [R10.9]     * Lower abdominal pain [R10.30]    Procedure/CPT® Codes:    43-year-old white male with a left distal impacted stone for ureteroscopy following an informed consent brought the procedure suite prepped draped in sterile fashion I used the Webb 4.5 Welsh ureteroscope identified a normal urethra entering the bladder identified a reddened orifice advanced up to the level of the stone broke it numerous pieces and extracted each piece sequentially the ureter was quite tight and I went ahead and chose to leave the stent which was a 5 x 24 double-J stent in perfect position visually and fluoroscopically with an attached lanyard for the next 48 hours was tolerated well  Procedure(s):  URETEROSCOPY LASER LITHOTRIPSY  CYSTOSCOPY URETERAL STONE EXTRACTION  Stent Placement    Surgeon(s):  David Eldridge MD    Anesthesia: see anesthesia record    Staff:   Circulator: Shaila Arroyo RN  Scrub Person: Louisa Treviño LPN; Staci Shields    Estimated Blood Loss: none  Urine Voided: * No values recorded between 11/13/2023 11:48 AM and 11/13/2023 12:24 PM *    Specimens:                ID Type Source Tests Collected by Time   1 :  Calculus Kidney, Left STONE ANALYSIS David Eldridge MD 11/13/2023 1219         Drains: 5 x 24 double-J stent    Findings: Left impacted ureteral calculus    Blood: N/A    Complications: None    Grafts and Implants: None    David Eldridge MD     Date: 11/13/2023  Time: 12:28 EST

## 2023-11-13 NOTE — ANESTHESIA PREPROCEDURE EVALUATION
Anesthesia Evaluation     no history of anesthetic complications:   NPO Solid Status: > 8 hours  NPO Liquid Status: > 8 hours           Airway   Mallampati: II  TM distance: >3 FB  Neck ROM: full  No difficulty expected  Dental - normal exam     Pulmonary - normal exam   Cardiovascular - normal exam    (+) hypertension, valvular problems/murmurs, hyperlipidemia      Neuro/Psych  GI/Hepatic/Renal/Endo    (+) GERD, diabetes mellitus type 2    Musculoskeletal     Abdominal  - normal exam    Bowel sounds: normal.   Substance History      OB/GYN          Other                          Anesthesia Plan    ASA 2     general with block     intravenous induction     Anesthetic plan, risks, benefits, and alternatives have been provided, discussed and informed consent has been obtained with: patient.        CODE STATUS:

## 2023-11-13 NOTE — ANESTHESIA PROCEDURE NOTES
Airway  Urgency: elective    Date/Time: 11/13/2023 11:51 AM  Airway not difficult    General Information and Staff    Patient location during procedure: OR  CRNA/CAA: Gina Saba CRNA    Indications and Patient Condition  Indications for airway management: airway protection    Preoxygenated: yes  Mask difficulty assessment: 0 - not attempted    Final Airway Details  Final airway type: supraglottic airway      Successful airway: unique  Size 4     Number of attempts at approach: 1  Assessment: lips, teeth, and gum same as pre-op

## 2023-11-16 ENCOUNTER — OFFICE VISIT (OUTPATIENT)
Dept: UROLOGY | Facility: CLINIC | Age: 43
End: 2023-11-16
Payer: COMMERCIAL

## 2023-11-16 VITALS
SYSTOLIC BLOOD PRESSURE: 148 MMHG | HEIGHT: 64 IN | BODY MASS INDEX: 23.39 KG/M2 | HEART RATE: 102 BPM | DIASTOLIC BLOOD PRESSURE: 72 MMHG | WEIGHT: 137 LBS

## 2023-11-16 DIAGNOSIS — N20.1 LEFT URETERAL STONE: Primary | ICD-10-CM

## 2023-11-16 PROCEDURE — 99213 OFFICE O/P EST LOW 20 MIN: CPT | Performed by: UROLOGY

## 2023-11-16 NOTE — PROGRESS NOTES
Chief Complaint:      Chief Complaint   Patient presents with    Post-op       HPI:   43 y.o. male returns today status post ureteroscopy he is here for stent removal with an attached lanyard he is done very well it was removed without complication I will see him back in 6 months with KUB I discussed techniques to avoid recurrent stones.    Past Medical History:     Past Medical History:   Diagnosis Date    Abnormal ECG     Arthritis     Diabetes mellitus     Elevated cholesterol     GERD (gastroesophageal reflux disease)     Heart murmur 10/14/2021    Hyperlipidemia     Hypertension     Kidney stones        Current Meds:     Current Outpatient Medications   Medication Sig Dispense Refill    gabapentin (NEURONTIN) 100 MG capsule Take 1 capsule by mouth Daily.      HYDROcodone-acetaminophen (NORCO) 5-325 MG per tablet Take 2 tablets by mouth Every 4 (Four) Hours As Needed.      ketorolac (TORADOL) 10 MG tablet Take 1 tablet by mouth Every 6 (Six) Hours As Needed.      linaclotide (Linzess) 72 MCG capsule capsule Take 1 capsule by mouth Every Morning Before Breakfast for 8 days. 8 capsule 0    losartan (COZAAR) 50 MG tablet Take 1 tablet by mouth Daily.      metFORMIN ER (GLUCOPHAGE-XR) 500 MG 24 hr tablet Take 1 tablet by mouth 2 (Two) Times a Day.      metoprolol succinate XL (TOPROL-XL) 25 MG 24 hr tablet Take 1 tablet by mouth once daily 90 tablet 0    oxyCODONE-acetaminophen (Percocet)  MG per tablet Take 1 tablet by mouth Every 6 (Six) Hours As Needed for Moderate (Pain). 12 tablet 0    rosuvastatin (CRESTOR) 10 MG tablet Take 1 tablet by mouth Daily.      tamsulosin (FLOMAX) 0.4 MG capsule 24 hr capsule Take 1 capsule by mouth Daily. 30 capsule 1    Testosterone Cypionate (DEPOTESTOTERONE CYPIONATE) 200 MG/ML injection Inject 1 mL into the appropriate muscle as directed by prescriber Every 14 (Fourteen) Days.      Tirzepatide (Mounjaro) 2.5 MG/0.5ML solution pen-injector Inject 0.5 mL under the skin into  the appropriate area as directed Every 7 (Seven) Days.       No current facility-administered medications for this visit.        Allergies:      Allergies   Allergen Reactions    Penicillins Other (See Comments)     Per mother when you child        Past Surgical History:     Past Surgical History:   Procedure Laterality Date    CYSTOSCOPY Left 11/13/2023    Procedure: CYSTOSCOPY URETERAL STONE EXTRACTION;  Surgeon: David Eldridge MD;  Location: [facility];  Service: Urology;  Laterality: Left;    NO PAST SURGERIES      URETEROSCOPY LASER LITHOTRIPSY WITH STENT INSERTION Left 11/13/2023    Procedure: URETEROSCOPY LASER LITHOTRIPSY;  Surgeon: David Eldridge MD;  Location: [facility];  Service: Urology;  Laterality: Left;       Social History:     Social History     Socioeconomic History    Marital status:    Tobacco Use    Smoking status: Never    Smokeless tobacco: Never   Vaping Use    Vaping Use: Never used   Substance and Sexual Activity    Alcohol use: Never    Drug use: Never    Sexual activity: Defer     Partners: Female       Family History:     Family History   Problem Relation Age of Onset    Hypertension Mother     Heart disease Mother     Diabetes Mother     Hypertension Father     Heart disease Father     Diabetes Father     Hypertension Brother        Review of Systems:     Review of Systems   Constitutional: Negative.    HENT: Negative.     Eyes: Negative.    Respiratory: Negative.     Cardiovascular: Negative.    Gastrointestinal: Negative.    Endocrine: Negative.    Musculoskeletal: Negative.    Allergic/Immunologic: Negative.    Neurological: Negative.    Hematological: Negative.    Psychiatric/Behavioral: Negative.         Physical Exam:     Physical Exam  Vitals and nursing note reviewed.   Constitutional:       Appearance: He is well-developed.   HENT:      Head: Normocephalic and atraumatic.   Eyes:      Conjunctiva/sclera: Conjunctivae normal.      Pupils: Pupils are  equal, round, and reactive to light.   Cardiovascular:      Rate and Rhythm: Normal rate and regular rhythm.      Heart sounds: Normal heart sounds.   Pulmonary:      Effort: Pulmonary effort is normal.      Breath sounds: Normal breath sounds.   Abdominal:      General: Bowel sounds are normal.      Palpations: Abdomen is soft.   Musculoskeletal:         General: Normal range of motion.      Cervical back: Normal range of motion.   Skin:     General: Skin is warm and dry.   Neurological:      Mental Status: He is alert and oriented to person, place, and time.      Deep Tendon Reflexes: Reflexes are normal and symmetric.   Psychiatric:         Behavior: Behavior normal.         Thought Content: Thought content normal.         Judgment: Judgment normal.         I have reviewed the following portions of the patient's history: Allergies, current medications, past family history, past medical history, past social history, past surgical history, problem list, and ROS and confirm it is accurate.    Recent Image (CT and/or KUB):      CT Abdomen and Pelvis: No results found for this or any previous visit.       CT Stone Protocol: No results found for this or any previous visit.       KUB: Results for orders placed in visit on 11/08/23    XR abdomen kub    Narrative  EXAM:  XR Abdomen, 1 View    EXAM DATE:  11/8/2023 1:12 PM    CLINICAL HISTORY:  LEFT RENAL STONE/FLANK PAIN; N20.1-Calculus of ureter;  R10.9-Unspecified abdominal pain    TECHNIQUE:  Frontal supine view of the abdomen/pelvis.    COMPARISON:  No relevant prior studies available.    FINDINGS:  Gastrointestinal tract:  Unremarkable as visualized.  No dilation.  Organs:  Calcification in the left lower pelvis may represent distal  ureter stone and is approximately 4.5 mm.  No additional renal or  ureteral stones radiographically evident.  Bones/joints:  Unremarkable as visualized.    Impression  1.  No additional renal or ureteral stones radiographically  evident.  2.  Calcification in the left lower pelvis may represent distal ureter  stone and is approximately 4.5 mm.      This report was finalized on 11/8/2023 4:40 PM by Dr. Kristofer Osborn MD.       Labs (past 3 months):      Admission on 11/13/2023, Discharged on 11/13/2023   Component Date Value Ref Range Status    Glucose 11/13/2023 104  70 - 130 mg/dL Final   Office Visit on 11/08/2023   Component Date Value Ref Range Status    Color 11/08/2023 Yellow  Yellow, Straw, Dark Yellow, Pilar Final    Clarity, UA 11/08/2023 Clear  Clear Final    Specific Gravity  11/08/2023 1.000 (A)  1.005 - 1.030 Final    pH, Urine 11/08/2023 6.0  5.0 - 8.0 Final    Leukocytes 11/08/2023 Negative  Negative Final    Nitrite, UA 11/08/2023 Negative  Negative Final    Protein, POC 11/08/2023 Negative  Negative mg/dL Final    Glucose, UA 11/08/2023 Negative  Negative mg/dL Final    Ketones, UA 11/08/2023 Negative  Negative Final    Urobilinogen, UA 11/08/2023 Normal  Normal, 0.2 E.U./dL Final    Bilirubin 11/08/2023 Negative  Negative Final    Blood, UA 11/08/2023 Negative  Negative Final    Lot Number 11/08/2023 [id number]   Final    Expiration Date 11/08/2023 10/25/2024   Final        Procedure:       Assessment/Plan:   Ureteral calculus-patient has been diagnosed with a ureteral calculus.  We have discussed the various parameters regarding spontaneous passage including the notion that a 2 mm stone has a high likelihood of spontaneous passage versus a larger stone being caught up in the upper areas of the urinary tract.  We also discussed the medical management of stone disease and the use of medical expulsive therapy in the form of Flomax.  This is used in an off label setting.  We discussed the indicators for intervention including  absolute indicators such as sepsis and uncontrollable severe pain, as well as the relative indicators of moderate pain that is well-controlled with various analgesia.  I also talked about  nonoperative management including ambulation and increasing fluids and hot tub as being an effective adjuncts in the treatment of a ureteral stone.  Status post ureteroscopy stent was removed          This document has been electronically signed by GUANACO WARNER MD November 16, 2023 12:40 EST    Dictated Utilizing Dragon Dictation: Part of this note may be an electronic transcription/translation of spoken language to printed text using the Dragon Dictation System.  Answers submitted by the patient for this visit:  Primary Reason for Visit (Submitted on 11/14/2023)  What is the primary reason for your visit?: Other  Other (Submitted on 11/14/2023)  Please describe your symptoms.: Need to have stent taken out .  Have you had these symptoms before?: No  How long have you been having these symptoms?: 1-4 days  Please describe any probable cause for these symptoms. : Kidney stone surgery

## 2023-11-20 LAB
CALCIUM OXALATE DIHYDRATE MFR STONE IR: 10 %
COLOR STONE: NORMAL
COM MFR STONE: 90 %
COMPN STONE: NORMAL
LABORATORY COMMENT REPORT: NORMAL
Lab: NORMAL
Lab: NORMAL
PHOTO: NORMAL
SIZE STONE: NORMAL MM
SPEC SOURCE SUBJ: NORMAL
WT STONE: 13 MG

## 2025-04-08 ENCOUNTER — OFFICE VISIT (OUTPATIENT)
Dept: CARDIOLOGY | Facility: CLINIC | Age: 45
End: 2025-04-08
Payer: COMMERCIAL

## 2025-04-08 VITALS
HEIGHT: 64 IN | WEIGHT: 147.6 LBS | HEART RATE: 103 BPM | DIASTOLIC BLOOD PRESSURE: 86 MMHG | SYSTOLIC BLOOD PRESSURE: 142 MMHG | BODY MASS INDEX: 25.2 KG/M2 | OXYGEN SATURATION: 98 %

## 2025-04-08 DIAGNOSIS — I10 PRIMARY HYPERTENSION: ICD-10-CM

## 2025-04-08 DIAGNOSIS — E11.9 TYPE 2 DIABETES MELLITUS WITHOUT COMPLICATION, WITHOUT LONG-TERM CURRENT USE OF INSULIN: ICD-10-CM

## 2025-04-08 DIAGNOSIS — R06.09 DYSPNEA ON EXERTION: Primary | ICD-10-CM

## 2025-04-08 DIAGNOSIS — Z91.89 MULTIPLE RISK FACTORS FOR CORONARY ARTERY DISEASE: ICD-10-CM

## 2025-04-08 DIAGNOSIS — E78.2 MIXED HYPERLIPIDEMIA: ICD-10-CM

## 2025-04-08 PROCEDURE — 93000 ELECTROCARDIOGRAM COMPLETE: CPT | Performed by: INTERNAL MEDICINE

## 2025-04-08 PROCEDURE — 99204 OFFICE O/P NEW MOD 45 MIN: CPT | Performed by: INTERNAL MEDICINE

## 2025-04-08 RX ORDER — SODIUM CHLORIDE 9 MG/ML
40 INJECTION, SOLUTION INTRAVENOUS AS NEEDED
OUTPATIENT
Start: 2025-04-08

## 2025-04-08 RX ORDER — NITROGLYCERIN 0.4 MG/1
0.4 TABLET SUBLINGUAL
OUTPATIENT
Start: 2025-04-08 | End: 2025-04-08

## 2025-04-08 RX ORDER — METOPROLOL TARTRATE 50 MG
50 TABLET ORAL ONCE
Qty: 1 TABLET | Refills: 0 | Status: SHIPPED | OUTPATIENT
Start: 2025-04-08 | End: 2025-04-08

## 2025-04-08 RX ORDER — SODIUM CHLORIDE 0.9 % (FLUSH) 0.9 %
10 SYRINGE (ML) INJECTION AS NEEDED
OUTPATIENT
Start: 2025-04-08

## 2025-04-08 RX ORDER — SODIUM CHLORIDE 0.9 % (FLUSH) 0.9 %
10 SYRINGE (ML) INJECTION EVERY 12 HOURS SCHEDULED
OUTPATIENT
Start: 2025-04-08

## 2025-04-08 NOTE — PROGRESS NOTES
Trisha Dahl APRN Tammy L Freeman, APRN    Lavelle Storey  1980 04/08/2025    Raul Storey is a 45 y.o. male who presents today to [facility] for Chest Pain and Shortness of Breath.    Chest Pain   Associated symptoms include shortness of breath.   Shortness of Breath  Associated symptoms include chest pain.   :  History of Present Illness  Mr. Valderrama is a 45 years old male with past medical history of hypertension, hyperlipidemia, type II DM and significant family history of premature atherosclerotic heart disease who presents today to [facility] care.  Patient was referred to us by his primary care provider for episodes of shortness of breath with exertion.    He was referred to our clinic due to experiencing dyspnea after engaging in physical activity with his dogs, which he initially attributed to a recent illness. His primary care physician reported normal lung auscultation findings and recommended a cardiology consultation for further evaluation. He has a history of undergoing a stress test and echocardiogram in 2021, both of which yielded normal results. At that time, he was diagnosed with symptomatic tachycardia  and was prescribed metoprolol, which he continues to take. The onset of his current symptoms occurred last week, initially presenting as shortness of breath during physical activity with his dogs, and subsequently persisting for several days. He hypothesizes that these episodes may be related to his recent lack of regular exercise. He reports an overall good health status but has observed an increase in his resting heart rate, as monitored by his Apple watch, from the low 80s to around 100. He is not experiencing any associated symptoms such as lightheadedness, dizziness, palpitations, or headaches. 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 He recalls a previous episode of fluctuating heart rate and diaphoresis, which was initially suspected to be a myocardial infarction but was later determined to be a panic attack. He also reports a recent episode of vomiting lasting 12 to 13 hours, followed by a week of diarrhea and chills, which he believes may have been due to flu. He sought medical attention for these symptoms, during which he reports he had abnormal lipid panel. He has a known diagnosis of diabetes and a family history of cardiac disease, with three relatives having  from heart attacks in their 50s and 60s. He is currently on losartan for hypertension management.  His BP today is 142/86.    He reports no history of smoking, vaping, or marijuana use.   SOCIAL HISTORY  He does not smoke, vape, or use marijuana. He works as a manager at [employer].    FAMILY HISTORY  He has a family history of heart issues, with three family members having passed away from heart attacks in their 50s and 60s.           Cardiac risk factors:diabetes mellitus, hypercholesterolemia, hypertension, and Positive family Hx. of premature athersclerotivc disease.    Allergies   Allergen Reactions    Penicillins Other (See Comments)     Per mother when you child   :    Current Outpatient Medications:     gabapentin (NEURONTIN) 100 MG capsule, Take 1 capsule by mouth Daily., Disp: , Rfl:     HYDROcodone-acetaminophen (NORCO) 5-325 MG per tablet, Take 2 tablets by mouth Every 4 (Four) Hours As Needed., Disp: , Rfl:     ketorolac (TORADOL) 10 MG tablet, Take 1 tablet by mouth Every 6 (Six) Hours As Needed., Disp: , Rfl:     losartan (COZAAR) 50 MG tablet, Take 1 tablet by mouth Daily., Disp: , Rfl:     metoprolol succinate XL (TOPROL-XL) 25 MG 24 hr tablet, Take 1 tablet by mouth once daily, Disp: 90 tablet, Rfl: 0    rosuvastatin (CRESTOR) 10 MG tablet, Take 1 tablet by mouth Daily., Disp: , Rfl:     Testosterone Cypionate (DEPOTESTOTERONE CYPIONATE) 200 MG/ML  injection, Inject 1 mL into the appropriate muscle as directed by prescriber Every 14 (Fourteen) Days., Disp: , Rfl:     Tirzepatide (Mounjaro) 2.5 MG/0.5ML solution pen-injector, Inject 0.5 mL under the skin into the appropriate area as directed Every 7 (Seven) Days., Disp: , Rfl:     metFORMIN ER (GLUCOPHAGE-XR) 500 MG 24 hr tablet, Take 1 tablet by mouth 2 (Two) Times a Day. (Patient not taking: Reported on 4/8/2025), Disp: , Rfl:     metoprolol tartrate (LOPRESSOR) 50 MG tablet, Take 1 tablet by mouth 1 (One) Time for 1 dose. Take 50 mg One (1) Hour Prior to Coronary CTA, Disp: 1 tablet, Rfl: 0    oxyCODONE-acetaminophen (Percocet)  MG per tablet, Take 1 tablet by mouth Every 6 (Six) Hours As Needed for Moderate (Pain). (Patient not taking: Reported on 4/8/2025), Disp: 12 tablet, Rfl: 0    tamsulosin (FLOMAX) 0.4 MG capsule 24 hr capsule, Take 1 capsule by mouth Daily. (Patient not taking: Reported on 4/8/2025), Disp: 30 capsule, Rfl: 1    Past Medical History:   Diagnosis Date    Abnormal ECG     Arthritis     Diabetes mellitus     Elevated cholesterol     GERD (gastroesophageal reflux disease)     Heart murmur 10/14/2021    Hyperlipidemia     Hypertension     Kidney stones      Past Surgical History:   Procedure Laterality Date    CYSTOSCOPY Left 11/13/2023    Procedure: CYSTOSCOPY URETERAL STONE EXTRACTION;  Surgeon: David Eldridge MD;  Location: [facility];  Service: Urology;  Laterality: Left;    NO PAST SURGERIES      URETEROSCOPY LASER LITHOTRIPSY WITH STENT INSERTION Left 11/13/2023    Procedure: URETEROSCOPY LASER LITHOTRIPSY;  Surgeon: David Eldridge MD;  Location: [facility];  Service: Urology;  Laterality: Left;     Family History   Problem Relation Age of Onset    Hypertension Mother     Heart disease Mother     Diabetes Mother     Hypertension Father     Heart disease Father     Diabetes Father     Hypertension Brother      Social History     Tobacco Use    Smoking status:  "Never     Passive exposure: Never    Smokeless tobacco: Never   Vaping Use    Vaping status: Never Used   Substance Use Topics    Alcohol use: Never    Drug use: Never       Objective   Blood pressure 142/86, pulse 103, height 162.6 cm (64.02\"), weight 67 kg (147 lb 9.6 oz), SpO2 98%.  Body mass index is 25.32 kg/m².    Vitals reviewed.   Constitutional:       Appearance: Not in distress.   Neck:      Vascular: JVD normal.   Pulmonary:      Effort: Pulmonary effort is normal.      Breath sounds: Normal breath sounds.   Cardiovascular:      PMI at left midclavicular line. Normal rate. Regular rhythm. Normal S1. Normal S2.       Murmurs: There is no murmur.      No gallop.  No click. No rub.   Pulses:     Intact distal pulses.   Edema:     Peripheral edema absent.   Skin:     General: Skin is warm and dry.   Neurological:      Mental Status: Alert and oriented to person, place and time.       Physical Exam         DATA:   Results for orders placed during the hospital encounter of 01/05/22    Adult Transthoracic Echo Complete W/ Cont if Necessary Per Protocol    Interpretation Summary  · Normal left ventricular cavity size and wall thickness noted.  · Left ventricular ejection fraction appears to be 56 - 60%. Left ventricular systolic function is normal.  · Left ventricular diastolic function was normal.  · No significant valvular heart disease noted.  · No pericardial effusion detected.   Results for orders placed during the hospital encounter of 01/05/22    Treadmill Stress Test    Interpretation Summary  · A stress test was performed following the Catalino protocol.  · Resting EKG showed regular sinus rhythm rate of 85 bpm, nonspecific T wave changes were noted.  · Patient walked 9:17 on treadmill reaching target heart rate, patient denied any chest discomfort  · Blood pressure demonstrated a normal response to stress. Heart rate demonstrated a normal response to stress.  · ST segments did not show any diagnostic " "changes. No significant arrhythmia detected.  · The Duke Treadmill Score of 9.28 is consistent with a Low risk for ischemic heart disease.  · Stress test is felt to be negative for significant exercise-induced myocardial ischemia. Probability of significant coronary artery disease is low.   Results for orders placed during the hospital encounter of 01/05/22    Treadmill Stress Test    Interpretation Summary  · A stress test was performed following the Catalino protocol.  · Resting EKG showed regular sinus rhythm rate of 85 bpm, nonspecific T wave changes were noted.  · Patient walked 9:17 on treadmill reaching target heart rate, patient denied any chest discomfort  · Blood pressure demonstrated a normal response to stress. Heart rate demonstrated a normal response to stress.  · ST segments did not show any diagnostic changes. No significant arrhythmia detected.  · The Duke Treadmill Score of 9.28 is consistent with a Low risk for ischemic heart disease.  · Stress test is felt to be negative for significant exercise-induced myocardial ischemia. Probability of significant coronary artery disease is low.        No results found for: \"BNP\"  No results found for: \"PSA\"   No results found for: \"MG\"  No results found for: \"INR\"  No results found for: \"CKTOTAL\"  No results found for: \"CHOL\", \"CHLPL\"  No results found for: \"TRIG\"  No results found for: \"HDL\"  No results found for: \"LDL\", \"LDLDIRECT\"  No components found for: \"A1C\"      No results found for: \"TSH\"          Invalid input(s): \"LABALBU\", \"PROT\"        Results review: During today's encounter, all relevant clinical data has been reviewed.    Results  Imaging  Stress test and echocardiogram in 2021 were normal.      ECG 12 Lead    Date/Time: 4/8/2025 4:59 PM  Performed by: Suki Nance MD    Authorized by: Suki Nance MD  Comparison: compared with previous ECG from 10/14/2021  Comparison to previous ECG: Sinus tachycardia has replaced sinus rhythm  Rhythm: sinus " tachycardia  Rate: tachycardic  BPM: 103  QRS axis: normal    Clinical impression: non-specific ECG          Assessment & Plan    Diagnosis Plan   1. Dyspnea on exertion  CT Angiogram Coronary    nitroglycerin (NITROSTAT) SL tablet 0.4 mg    sodium chloride 0.9 % flush 10 mL    sodium chloride 0.9 % flush 10 mL    sodium chloride 0.9 % infusion 40 mL      2. Mixed hyperlipidemia        3. Primary hypertension        4. Multiple risk factors for coronary artery disease, hyperlipidemia, hypertension, diabetes mellitus        5. Type 2 diabetes mellitus without complication, without long-term current use of insulin          Assessment & Plan    - Patient experienced episode of shortness of breath after playing with his dogs, which persisted for a few days. Given his risk factors, including diabetes, hypertension, hyperlipidemia and a significant family history of heart disease, a CT coronary angiogram will be ordered to evaluate for significant plaque burden or blockages in the coronary arteries.   - Will request recent lipid panel results from primary care provider.  - Continue home Crestor 10 Mg daily, Toprol-XL 25 Mg daily, losartan 50 Mg daily.  - Patient is advised routine of moderate intensity exercise at least 30 minutes a day most days of the week.  Continue heart healthy diet.      Follow-up  The patient will follow up in 4 weeks.    Recommendations  Orders Placed This Encounter   Procedures    CT Angiogram Coronary    No Caffeine or Nicotine 4 Hours Prior to CTA Appointment    Nothing to Eat or Drink 4 Hours Prior to CTA Appointment    Do Not Take Phosphodiasterase Inhibitors in the 72 Hours Prior to Coronary CTA                 New Medications:   New Medications Ordered This Visit   Medications    metoprolol tartrate (LOPRESSOR) 50 MG tablet     Sig: Take 1 tablet by mouth 1 (One) Time for 1 dose. Take 50 mg One (1) Hour Prior to Coronary CTA     Dispense:  1 tablet     Refill:  0       Discontinued  Medications:   There are no discontinued medications.     Return in about 4 weeks (around 5/6/2025).      Thank you for allowing me to participate in the care of Lavelle Storey. Feel free to contact me directly with any further questions or concerns.      This document has been electronically signed by Suki Nance MD   April 8, 2025 16:56 EDT    Patient or patient representative verbalized consent for the use of Ambient Listening during the visit with  Suki Nance MD for chart documentation. 4/8/2025  16:56 EDT    Dictated Utilizing Dragon Dictation: Part of this note may be an electronic transcription/translation of spoken language to printed text using the Dragon Dictation System.

## 2025-04-08 NOTE — LETTER
April 8, 2025     ROMINA Wyatt  [address] [address]    Patient: Lavelle Storey   YOB: 1980   Date of Visit: 4/8/2025       Dear ROMINA Wyatt,    Lavelle Storey was in my office today. Below are the relevant portions of my assessment and plan of care.           If you have questions, please do not hesitate to call me. I look forward to following Lavelle along with you.         Sincerely,        Suki Nance MD        CC: No Recipients

## 2025-04-11 ENCOUNTER — PATIENT ROUNDING (BHMG ONLY) (OUTPATIENT)
Dept: CARDIOLOGY | Facility: CLINIC | Age: 45
End: 2025-04-11
Payer: COMMERCIAL

## 2025-04-11 NOTE — PROGRESS NOTES
Janey, My name is Ankit. I am a CMA at [facility] [place].    I want to officially welcome you to our practice and ask about your most recent visit.    What do you feel went well with your visit?    Do you have any recommendations on ways we may improve?    Overall, were you satisfied with your first visit to our practice?    I appreciate you taking the time to answer a few questions today.     We are always looking for ways to make our patients experiences even better.   Please complete the [employer] Survey after your visits. We would love to receive feedback from you.   You may also share any suggestions or concerns by replying to this message or calling our office.      Thank you for allowing us to participate in your healthcare.  Please let me know if I can be of further assistance.    Kind regards,    Ankit

## 2025-05-09 ENCOUNTER — TELEPHONE (OUTPATIENT)
Dept: CARDIOLOGY | Facility: CLINIC | Age: 45
End: 2025-05-09
Payer: COMMERCIAL

## 2025-05-09 NOTE — TELEPHONE ENCOUNTER
Pt is asking for instructions on taking metoprolol before his CT Angio.    Please advise as pts test is Sunday.

## 2025-05-11 ENCOUNTER — HOSPITAL ENCOUNTER (OUTPATIENT)
Dept: CT IMAGING | Facility: HOSPITAL | Age: 45
Discharge: HOME | End: 2025-05-11
Admitting: INTERNAL MEDICINE
Payer: COMMERCIAL

## 2025-05-11 VITALS — HEART RATE: 76 BPM | SYSTOLIC BLOOD PRESSURE: 95 MMHG | DIASTOLIC BLOOD PRESSURE: 65 MMHG

## 2025-05-11 DIAGNOSIS — R06.09 DYSPNEA ON EXERTION: ICD-10-CM

## 2025-05-11 PROCEDURE — 25510000001 IOPAMIDOL PER 1 ML: Performed by: INTERNAL MEDICINE

## 2025-05-11 PROCEDURE — 75574 CT ANGIO HRT W/3D IMAGE: CPT

## 2025-05-11 PROCEDURE — 82565 ASSAY OF CREATININE: CPT

## 2025-05-11 PROCEDURE — 75574 CT ANGIO HRT W/3D IMAGE: CPT | Performed by: RADIOLOGY

## 2025-05-11 RX ORDER — NITROGLYCERIN 0.4 MG/1
0.4 TABLET SUBLINGUAL
Status: DISCONTINUED | OUTPATIENT
Start: 2025-05-11 | End: 2025-05-12 | Stop reason: HOSPADM

## 2025-05-11 RX ORDER — IOPAMIDOL 755 MG/ML
100 INJECTION, SOLUTION INTRAVASCULAR
Status: COMPLETED | OUTPATIENT
Start: 2025-05-11 | End: 2025-05-11

## 2025-05-11 RX ADMIN — IOPAMIDOL 92 ML: 755 INJECTION, SOLUTION INTRAVENOUS at 15:15

## 2025-05-12 LAB — CREAT BLDA-MCNC: 1 MG/DL (ref 0.6–1.3)

## 2025-05-23 ENCOUNTER — APPOINTMENT (OUTPATIENT)
Dept: CT IMAGING | Facility: HOSPITAL | Age: 45
End: 2025-05-23
Payer: COMMERCIAL

## 2025-05-23 ENCOUNTER — HOSPITAL ENCOUNTER (EMERGENCY)
Facility: HOSPITAL | Age: 45
Discharge: HOME | End: 2025-05-23
Attending: EMERGENCY MEDICINE
Payer: COMMERCIAL

## 2025-05-23 ENCOUNTER — APPOINTMENT (OUTPATIENT)
Dept: ULTRASOUND IMAGING | Facility: HOSPITAL | Age: 45
End: 2025-05-23
Payer: COMMERCIAL

## 2025-05-23 ENCOUNTER — APPOINTMENT (OUTPATIENT)
Dept: GENERAL RADIOLOGY | Facility: HOSPITAL | Age: 45
End: 2025-05-23
Payer: COMMERCIAL

## 2025-05-23 VITALS
WEIGHT: 141 LBS | HEART RATE: 88 BPM | RESPIRATION RATE: 14 BRPM | HEIGHT: 64 IN | OXYGEN SATURATION: 94 % | SYSTOLIC BLOOD PRESSURE: 105 MMHG | TEMPERATURE: 97.9 F | DIASTOLIC BLOOD PRESSURE: 69 MMHG | BODY MASS INDEX: 24.07 KG/M2

## 2025-05-23 DIAGNOSIS — K52.9 COLITIS: Primary | ICD-10-CM

## 2025-05-23 LAB
ALBUMIN SERPL-MCNC: 4.4 G/DL (ref 3.5–5.2)
ALBUMIN/GLOB SERPL: 1.7 G/DL
ALP SERPL-CCNC: 65 U/L (ref 39–117)
ALT SERPL W P-5'-P-CCNC: 32 U/L (ref 1–41)
ANION GAP SERPL CALCULATED.3IONS-SCNC: 10.9 MMOL/L (ref 5–15)
AST SERPL-CCNC: 23 U/L (ref 1–40)
BASOPHILS # BLD AUTO: 0.03 10*3/MM3 (ref 0–0.2)
BASOPHILS NFR BLD AUTO: 0.5 % (ref 0–1.5)
BILIRUB SERPL-MCNC: 0.5 MG/DL (ref 0–1.2)
BILIRUB UR QL STRIP: NEGATIVE
BUN SERPL-MCNC: 12 MG/DL (ref 6–20)
BUN/CREAT SERPL: 14.6 (ref 7–25)
CALCIUM SPEC-SCNC: 8.8 MG/DL (ref 8.6–10.5)
CHLORIDE SERPL-SCNC: 101 MMOL/L (ref 98–107)
CLARITY UR: CLEAR
CO2 SERPL-SCNC: 24.1 MMOL/L (ref 22–29)
COLOR UR: YELLOW
CREAT SERPL-MCNC: 0.82 MG/DL (ref 0.76–1.27)
CRP SERPL-MCNC: 1.27 MG/DL (ref 0–0.5)
DEPRECATED RDW RBC AUTO: 40.3 FL (ref 37–54)
EGFRCR SERPLBLD CKD-EPI 2021: 111.8 ML/MIN/1.73
EOSINOPHIL # BLD AUTO: 0.11 10*3/MM3 (ref 0–0.4)
EOSINOPHIL NFR BLD AUTO: 1.7 % (ref 0.3–6.2)
ERYTHROCYTE [DISTWIDTH] IN BLOOD BY AUTOMATED COUNT: 12.3 % (ref 12.3–15.4)
GLOBULIN UR ELPH-MCNC: 2.6 GM/DL
GLUCOSE SERPL-MCNC: 133 MG/DL (ref 65–99)
GLUCOSE UR STRIP-MCNC: NEGATIVE MG/DL
HCT VFR BLD AUTO: 45.5 % (ref 37.5–51)
HGB BLD-MCNC: 15.4 G/DL (ref 13–17.7)
HGB UR QL STRIP.AUTO: NEGATIVE
HOLD SPECIMEN: NORMAL
HOLD SPECIMEN: NORMAL
IMM GRANULOCYTES # BLD AUTO: 0.01 10*3/MM3 (ref 0–0.05)
IMM GRANULOCYTES NFR BLD AUTO: 0.2 % (ref 0–0.5)
KETONES UR QL STRIP: NEGATIVE
LEUKOCYTE ESTERASE UR QL STRIP.AUTO: NEGATIVE
LIPASE SERPL-CCNC: 38 U/L (ref 13–60)
LYMPHOCYTES # BLD AUTO: 1.74 10*3/MM3 (ref 0.7–3.1)
LYMPHOCYTES NFR BLD AUTO: 26.7 % (ref 19.6–45.3)
MCH RBC QN AUTO: 30.1 PG (ref 26.6–33)
MCHC RBC AUTO-ENTMCNC: 33.8 G/DL (ref 31.5–35.7)
MCV RBC AUTO: 89 FL (ref 79–97)
MONOCYTES # BLD AUTO: 0.54 10*3/MM3 (ref 0.1–0.9)
MONOCYTES NFR BLD AUTO: 8.3 % (ref 5–12)
NEUTROPHILS NFR BLD AUTO: 4.08 10*3/MM3 (ref 1.7–7)
NEUTROPHILS NFR BLD AUTO: 62.6 % (ref 42.7–76)
NITRITE UR QL STRIP: NEGATIVE
NRBC BLD AUTO-RTO: 0 /100 WBC (ref 0–0.2)
PH UR STRIP.AUTO: 7 [PH] (ref 5–8)
PLATELET # BLD AUTO: 220 10*3/MM3 (ref 140–450)
PMV BLD AUTO: 9 FL (ref 6–12)
POTASSIUM SERPL-SCNC: 4.3 MMOL/L (ref 3.5–5.2)
PROT SERPL-MCNC: 7 G/DL (ref 6–8.5)
PROT UR QL STRIP: NEGATIVE
RBC # BLD AUTO: 5.11 10*6/MM3 (ref 4.14–5.8)
SODIUM SERPL-SCNC: 136 MMOL/L (ref 136–145)
SP GR UR STRIP: >1.03 (ref 1–1.03)
UROBILINOGEN UR QL STRIP: ABNORMAL
WBC NRBC COR # BLD AUTO: 6.51 10*3/MM3 (ref 3.4–10.8)
WHOLE BLOOD HOLD COAG: NORMAL
WHOLE BLOOD HOLD SPECIMEN: NORMAL

## 2025-05-23 PROCEDURE — 74177 CT ABD & PELVIS W/CONTRAST: CPT | Performed by: RADIOLOGY

## 2025-05-23 PROCEDURE — 86140 C-REACTIVE PROTEIN: CPT | Performed by: NURSE PRACTITIONER

## 2025-05-23 PROCEDURE — 74177 CT ABD & PELVIS W/CONTRAST: CPT

## 2025-05-23 PROCEDURE — 96376 TX/PRO/DX INJ SAME DRUG ADON: CPT

## 2025-05-23 PROCEDURE — 83690 ASSAY OF LIPASE: CPT | Performed by: NURSE PRACTITIONER

## 2025-05-23 PROCEDURE — 80053 COMPREHEN METABOLIC PANEL: CPT | Performed by: NURSE PRACTITIONER

## 2025-05-23 PROCEDURE — 71045 X-RAY EXAM CHEST 1 VIEW: CPT

## 2025-05-23 PROCEDURE — 96375 TX/PRO/DX INJ NEW DRUG ADDON: CPT

## 2025-05-23 PROCEDURE — 76705 ECHO EXAM OF ABDOMEN: CPT

## 2025-05-23 PROCEDURE — 25010000002 ONDANSETRON PER 1 MG: Performed by: NURSE PRACTITIONER

## 2025-05-23 PROCEDURE — 99285 EMERGENCY DEPT VISIT HI MDM: CPT

## 2025-05-23 PROCEDURE — 25010000002 HYDROMORPHONE PER 4 MG: Performed by: NURSE PRACTITIONER

## 2025-05-23 PROCEDURE — 96361 HYDRATE IV INFUSION ADD-ON: CPT

## 2025-05-23 PROCEDURE — 85025 COMPLETE CBC W/AUTO DIFF WBC: CPT | Performed by: NURSE PRACTITIONER

## 2025-05-23 PROCEDURE — 25810000003 SODIUM CHLORIDE 0.9 % SOLUTION: Performed by: NURSE PRACTITIONER

## 2025-05-23 PROCEDURE — 71045 X-RAY EXAM CHEST 1 VIEW: CPT | Performed by: RADIOLOGY

## 2025-05-23 PROCEDURE — 81003 URINALYSIS AUTO W/O SCOPE: CPT | Performed by: NURSE PRACTITIONER

## 2025-05-23 PROCEDURE — 25510000001 IOPAMIDOL 61 % SOLUTION: Performed by: EMERGENCY MEDICINE

## 2025-05-23 PROCEDURE — 96374 THER/PROPH/DIAG INJ IV PUSH: CPT

## 2025-05-23 PROCEDURE — 76705 ECHO EXAM OF ABDOMEN: CPT | Performed by: RADIOLOGY

## 2025-05-23 RX ORDER — IOPAMIDOL 612 MG/ML
100 INJECTION, SOLUTION INTRAVASCULAR
Status: COMPLETED | OUTPATIENT
Start: 2025-05-23 | End: 2025-05-23

## 2025-05-23 RX ORDER — ONDANSETRON 4 MG/1
4 TABLET, ORALLY DISINTEGRATING ORAL EVERY 6 HOURS PRN
Qty: 12 TABLET | Refills: 0 | Status: SHIPPED | OUTPATIENT
Start: 2025-05-23 | End: 2025-05-23

## 2025-05-23 RX ORDER — HYDROCODONE BITARTRATE AND ACETAMINOPHEN 7.5; 325 MG/1; MG/1
1 TABLET ORAL EVERY 6 HOURS PRN
Qty: 12 TABLET | Refills: 0 | Status: SHIPPED | OUTPATIENT
Start: 2025-05-23 | End: 2025-05-23

## 2025-05-23 RX ORDER — ONDANSETRON 4 MG/1
4 TABLET, ORALLY DISINTEGRATING ORAL EVERY 6 HOURS PRN
Qty: 12 TABLET | Refills: 0 | Status: SHIPPED | OUTPATIENT
Start: 2025-05-23

## 2025-05-23 RX ORDER — METRONIDAZOLE 500 MG/1
500 TABLET ORAL 3 TIMES DAILY
Qty: 30 TABLET | Refills: 0 | Status: SHIPPED | OUTPATIENT
Start: 2025-05-23 | End: 2025-06-02

## 2025-05-23 RX ORDER — HYDROMORPHONE HYDROCHLORIDE 1 MG/ML
0.5 INJECTION, SOLUTION INTRAMUSCULAR; INTRAVENOUS; SUBCUTANEOUS ONCE
Refills: 0 | Status: COMPLETED | OUTPATIENT
Start: 2025-05-23 | End: 2025-05-23

## 2025-05-23 RX ORDER — CIPROFLOXACIN 500 MG/1
500 TABLET, FILM COATED ORAL 2 TIMES DAILY
Qty: 20 TABLET | Refills: 0 | Status: SHIPPED | OUTPATIENT
Start: 2025-05-23 | End: 2025-06-02

## 2025-05-23 RX ORDER — HYDROCODONE BITARTRATE AND ACETAMINOPHEN 7.5; 325 MG/1; MG/1
1 TABLET ORAL EVERY 6 HOURS PRN
Qty: 12 TABLET | Refills: 0 | Status: SHIPPED | OUTPATIENT
Start: 2025-05-23

## 2025-05-23 RX ORDER — SODIUM CHLORIDE 0.9 % (FLUSH) 0.9 %
10 SYRINGE (ML) INJECTION AS NEEDED
Status: DISCONTINUED | OUTPATIENT
Start: 2025-05-23 | End: 2025-05-23 | Stop reason: HOSPADM

## 2025-05-23 RX ORDER — ONDANSETRON 2 MG/ML
4 INJECTION INTRAMUSCULAR; INTRAVENOUS ONCE
Status: COMPLETED | OUTPATIENT
Start: 2025-05-23 | End: 2025-05-23

## 2025-05-23 RX ADMIN — HYDROMORPHONE HYDROCHLORIDE 0.5 MG: 1 INJECTION, SOLUTION INTRAMUSCULAR; INTRAVENOUS; SUBCUTANEOUS at 11:16

## 2025-05-23 RX ADMIN — ONDANSETRON 4 MG: 2 INJECTION INTRAMUSCULAR; INTRAVENOUS at 11:16

## 2025-05-23 RX ADMIN — IOPAMIDOL 85 ML: 612 INJECTION, SOLUTION INTRAVENOUS at 12:08

## 2025-05-23 RX ADMIN — HYDROMORPHONE HYDROCHLORIDE 0.5 MG: 1 INJECTION, SOLUTION INTRAMUSCULAR; INTRAVENOUS; SUBCUTANEOUS at 13:59

## 2025-05-23 RX ADMIN — SODIUM CHLORIDE 1000 ML: 9 INJECTION, SOLUTION INTRAVENOUS at 11:15

## 2025-05-23 NOTE — ED PROVIDER NOTES
Subjective   History of Present Illness  Patient is a 45-year-old male who presents today for right upper quadrant abdominal pain.  Patient reports pain in the right upper quadrant and left flank.  Patient reports pain started about 2 days ago.  Patient reports pain is worsened.  Patient denies nausea or fever.  Patient denies vomiting.  Patient reports he did have some nausea last night but denies any currently.  Patient denies any past history of abdominal surgery.  Patient reports pain is worse with inspiration.    Abdominal Pain      Review of Systems   Constitutional: Negative.    HENT: Negative.     Eyes: Negative.    Respiratory: Negative.     Cardiovascular: Negative.    Gastrointestinal:  Positive for abdominal pain.   Endocrine: Negative.    Genitourinary: Negative.    Musculoskeletal: Negative.    Skin: Negative.    Allergic/Immunologic: Negative.    Neurological: Negative.    Hematological: Negative.    Psychiatric/Behavioral: Negative.         Past Medical History:   Diagnosis Date    Abnormal ECG     Arthritis     Diabetes mellitus     Elevated cholesterol     GERD (gastroesophageal reflux disease)     Heart murmur 10/14/2021    Hyperlipidemia     Hypertension     Kidney stones        Allergies   Allergen Reactions    Penicillins Other (See Comments)     Per mother when you child       Past Surgical History:   Procedure Laterality Date    CYSTOSCOPY Left 11/13/2023    Procedure: CYSTOSCOPY URETERAL STONE EXTRACTION;  Surgeon: David Eldridge MD;  Location: [facility];  Service: Urology;  Laterality: Left;    NO PAST SURGERIES      URETEROSCOPY LASER LITHOTRIPSY WITH STENT INSERTION Left 11/13/2023    Procedure: URETEROSCOPY LASER LITHOTRIPSY;  Surgeon: David Eldridge MD;  Location: [facility];  Service: Urology;  Laterality: Left;       Family History   Problem Relation Age of Onset    Hypertension Mother     Heart disease Mother     Diabetes Mother     Hypertension Father     Heart  disease Father     Diabetes Father     Hypertension Brother        Social History     Socioeconomic History    Marital status:    Tobacco Use    Smoking status: Never     Passive exposure: Never    Smokeless tobacco: Never   Vaping Use    Vaping status: Never Used   Substance and Sexual Activity    Alcohol use: Never    Drug use: Never    Sexual activity: Defer     Partners: Female           Objective   Physical Exam  Vitals and nursing note reviewed.   Constitutional:       Appearance: He is well-developed.   HENT:      Head: Normocephalic.      Right Ear: External ear normal.      Left Ear: External ear normal.   Eyes:      Conjunctiva/sclera: Conjunctivae normal.      Pupils: Pupils are equal, round, and reactive to light.   Cardiovascular:      Rate and Rhythm: Normal rate and regular rhythm.      Heart sounds: Normal heart sounds.   Pulmonary:      Effort: Pulmonary effort is normal.      Breath sounds: Normal breath sounds.   Abdominal:      General: Bowel sounds are normal.      Palpations: Abdomen is soft.      Tenderness: There is abdominal tenderness in the right upper quadrant.   Musculoskeletal:         General: Normal range of motion.      Cervical back: Normal range of motion and neck supple.   Skin:     General: Skin is warm and dry.      Capillary Refill: Capillary refill takes less than 2 seconds.   Neurological:      Mental Status: He is alert and oriented to person, place, and time.   Psychiatric:         Behavior: Behavior normal.         Thought Content: Thought content normal.         Procedures         Results for orders placed or performed during the hospital encounter of 05/23/25   Comprehensive Metabolic Panel    Collection Time: 05/23/25 11:14 AM    Specimen: Arm, Right; Blood   Result Value Ref Range    Glucose 133 (H) 65 - 99 mg/dL    BUN 12 6 - 20 mg/dL    Creatinine 0.82 0.76 - 1.27 mg/dL    Sodium 136 136 - 145 mmol/L    Potassium 4.3 3.5 - 5.2 mmol/L    Chloride 101 98 - 107  mmol/L    CO2 24.1 22.0 - 29.0 mmol/L    Calcium 8.8 8.6 - 10.5 mg/dL    Total Protein 7.0 6.0 - 8.5 g/dL    Albumin 4.4 3.5 - 5.2 g/dL    ALT (SGPT) 32 1 - 41 U/L    AST (SGOT) 23 1 - 40 U/L    Alkaline Phosphatase 65 39 - 117 U/L    Total Bilirubin 0.5 0.0 - 1.2 mg/dL    Globulin 2.6 gm/dL    A/G Ratio 1.7 g/dL    BUN/Creatinine Ratio 14.6 7.0 - 25.0    Anion Gap 10.9 5.0 - 15.0 mmol/L    eGFR 111.8 >60.0 mL/min/1.73   C-reactive Protein    Collection Time: 05/23/25 11:14 AM    Specimen: Arm, Right; Blood   Result Value Ref Range    C-Reactive Protein 1.27 (H) 0.00 - 0.50 mg/dL   CBC Auto Differential    Collection Time: 05/23/25 11:14 AM    Specimen: Arm, Right; Blood   Result Value Ref Range    WBC 6.51 3.40 - 10.80 10*3/mm3    RBC 5.11 4.14 - 5.80 10*6/mm3    Hemoglobin 15.4 13.0 - 17.7 g/dL    Hematocrit 45.5 37.5 - 51.0 %    MCV 89.0 79.0 - 97.0 fL    MCH 30.1 26.6 - 33.0 pg    MCHC 33.8 31.5 - 35.7 g/dL    RDW 12.3 12.3 - 15.4 %    RDW-SD 40.3 37.0 - 54.0 fl    MPV 9.0 6.0 - 12.0 fL    Platelets 220 140 - 450 10*3/mm3    Neutrophil % 62.6 42.7 - 76.0 %    Lymphocyte % 26.7 19.6 - 45.3 %    Monocyte % 8.3 5.0 - 12.0 %    Eosinophil % 1.7 0.3 - 6.2 %    Basophil % 0.5 0.0 - 1.5 %    Immature Grans % 0.2 0.0 - 0.5 %    Neutrophils, Absolute 4.08 1.70 - 7.00 10*3/mm3    Lymphocytes, Absolute 1.74 0.70 - 3.10 10*3/mm3    Monocytes, Absolute 0.54 0.10 - 0.90 10*3/mm3    Eosinophils, Absolute 0.11 0.00 - 0.40 10*3/mm3    Basophils, Absolute 0.03 0.00 - 0.20 10*3/mm3    Immature Grans, Absolute 0.01 0.00 - 0.05 10*3/mm3    nRBC 0.0 0.0 - 0.2 /100 WBC   Lipase    Collection Time: 05/23/25 11:14 AM    Specimen: Arm, Right; Blood   Result Value Ref Range    Lipase 38 13 - 60 U/L   Green Top (Gel)    Collection Time: 05/23/25 11:14 AM   Result Value Ref Range    Extra Tube Hold for add-ons.    Lavender Top    Collection Time: 05/23/25 11:14 AM   Result Value Ref Range    Extra Tube hold for add-on    Gold Top - SST     Collection Time: 05/23/25 11:14 AM   Result Value Ref Range    Extra Tube Hold for add-ons.    Light Blue Top    Collection Time: 05/23/25 11:14 AM   Result Value Ref Range    Extra Tube Hold for add-ons.    Urinalysis With Microscopic If Indicated (No Culture) - Urine, Clean Catch    Collection Time: 05/23/25  1:15 PM    Specimen: Urine, Clean Catch   Result Value Ref Range    Color, UA Yellow Yellow, Straw    Appearance, UA Clear Clear    pH, UA 7.0 5.0 - 8.0    Specific Gravity, UA >1.030 (H) 1.005 - 1.030    Glucose, UA Negative Negative    Ketones, UA Negative Negative    Bilirubin, UA Negative Negative    Blood, UA Negative Negative    Protein, UA Negative Negative    Leuk Esterase, UA Negative Negative    Nitrite, UA Negative Negative    Urobilinogen, UA 1.0 E.U./dL 0.2 - 1.0 E.U./dL     US Abdomen Limited   Final Result     Fatty infiltration of the liver.       This report was finalized on 5/23/2025 1:28 PM by Dr. Steve Winter MD.          CT Abdomen Pelvis With Contrast   Final Result     Thickening of the proximal transverse colon with surrounding fat   stranding that may represent focal colitis but other etiologies such as   malignancy not completely excluded and follow-up colonoscopy is   recommended.       This report was finalized on 5/23/2025 12:25 PM by Dr. Steve Winter MD.          XR Chest AP   Final Result     Unremarkable exam. No acute cardiopulmonary findings identified.       This report was finalized on 5/23/2025 12:05 PM by Dr. Steve Winter MD.              ED Course                                                       Medical Decision Making  MDM:    Escalation of care including admission/observation considered    - Discussions of management with other providers:  None    - Discussed/reviewed with Radiology regarding test interpretation    - Independent interpretation: None    - Additional patient history obtained from: None    - Review of external non-ED record (if available):  Prior  Inpt record, Office record, Outpt record, Prior Outpt labs, PCP record, Outside ED record, Other    - Chronic conditions affecting care: See HPI and medical Hx.    - Social Determinants of health significantly affecting care:  None        Medical Decision Making Discussion:    Patient is a 45-year-old male who presents today for right upper quadrant abdominal pain.  Patient reports pain in the right upper quadrant and left flank.  Patient reports pain started about 2 days ago.  Patient reports pain is worsened.  Patient denies nausea or fever.  Patient denies vomiting.  Patient reports he did have some nausea last night but denies any currently.  Patient denies any past history of abdominal surgery.  Patient reports pain is worse with inspiration.    Patient was treated for colitis but encouraged to follow up with GI or surgery for evaluation of CT abnormality to rule out any mass. This was discussed at length with patient and patient was encouraged to return for any worsening symptoms.       The patient has been given very strict return precautions to return to the emergency department should there be any acute change or worsening of their condition.  I have explained my findings and the patient has expressed understanding to me.  I explained that the work-up performed in the ED has been based on the specific complaint and concern, as the nature of emergency medicine is complaint driven and they understand that new symptoms may arise.  I have told them that, should there be any new symptoms, worsening or changing symptoms, a new work-up may be indicated that they are encouraged to return to the emergency department or promptly contact their primary care physician. We have employed a shared decision-making process as the discussion of their disposition.  The patient has been educated as to the nature of the visit, the tests and work-up performed and the findings from today's visit. At this time, there does not  appear to be any acute emergent process that necessitates admission to the hospital, however, the patient understands that this can change unexpectedly. At this time, the patient is stable for discharge home and agrees to follow-up with her primary care physician in the next 24 to 48 hours or earlier should they be able to obtain an appointment.    The patient was counseled regarding diagnostic results and treatment plan and patient has indicated understanding of these instructions.      Problems Addressed:  Colitis: complicated acute illness or injury    Amount and/or Complexity of Data Reviewed  Labs: ordered. Decision-making details documented in ED Course.  Radiology: ordered. Decision-making details documented in ED Course.    Risk  Prescription drug management.        Final diagnoses:   Colitis       ED Disposition  ED Disposition       ED Disposition   Discharge    Condition   Stable    Comment   --               Trisha Dahl JASMIN, APRN  [address] [facility] [address]  [facility] [id number] [phone]    Schedule an appointment as soon as possible for a visit   For further evaluation    Milla Andrade MD  [address] [id number] [phone]    Schedule an appointment as soon as possible for a visit   For further evaluation    Fco Darden MD  [address] [facility] [id number] [phone]    Schedule an appointment as soon as possible for a visit   For further evaluation         Medication List        New Prescriptions      ciprofloxacin 500 MG tablet  Commonly known as: CIPRO  Take 1 tablet by mouth 2 (Two) Times a Day for 10 days.     HYDROcodone-acetaminophen 7.5-325 MG per tablet  Commonly known as: NORCO  Take 1 tablet by mouth Every 6 (Six) Hours As Needed for Severe Pain.  Replaces: HYDROcodone-acetaminophen 5-325 MG per tablet     metroNIDAZOLE 500 MG tablet  Commonly known as: FLAGYL  Take 1 tablet by mouth 3 (Three) Times a Day for 10 days.     ondansetron ODT 4 MG  disintegrating tablet  Commonly known as: ZOFRAN-ODT  Place 1 tablet on the tongue Every 6 (Six) Hours As Needed for Vomiting or Nausea.            Stop      HYDROcodone-acetaminophen 5-325 MG per tablet  Commonly known as: NORCO  Replaced by: HYDROcodone-acetaminophen 7.5-325 MG per tablet               Where to Get Your Medications        These medications were sent to [facility] [address] [place] [address] - [phone]  - [phone] [address] [address]      Phone: [phone]   ciprofloxacin 500 MG tablet  HYDROcodone-acetaminophen 7.5-325 MG per tablet  metroNIDAZOLE 500 MG tablet  ondansetron ODT 4 MG disintegrating tablet            Jerson Rizvi, APRN  05/23/25 [id number]

## 2025-05-23 NOTE — DISCHARGE INSTRUCTIONS
Follow up with general surgery or GI physician to be evaluated for colonoscopy due to CT findings.    Return to the emergency room for worsening symptoms.    Please follow up with your primary care physician in the next 1-3 days. If this is not possible, please return to the ED for re-evaluation.    It is IMPORTANT to see your DOCTOR OR PRIMARY CARE PROVIDER. Emergency Care may be incomplete without proper follow-up.  Your evaluation in the emergency department is focused on a specific clinical complaint, at a given moment in time.  Symptoms sometimes change or new symptoms might arise after you leave the Emergency Department. It is important that you call your doctor if you become worse in any way, or promptly return to the Emergency Department should any new, or worsening/changing symptom occur.  You are strongly urged to follow-up with your physician to assure complete and thorough care. PLEASE CALL YOUR DOCTORS OFFICE TODAY, INFORM THEM THAT YOU WERE SEEN IN THE EMERGENCY DEPARTMENT, AND THAT YOU NEED TO BE SEEN IMMEDIATELY FOR CLOSE FOLLOW UP.  If you do not have a primary care doctor we encourage you to proactively seek a local physician for close follow up.  Consider local clinics, free or sliding scale clinics, [place] [facility]  You can always return to the Emergency Department if you are having difficulty coordinating close follow up.  If medications were prescribed you should fill them at your local pharmacy immediately and take only as prescribed.  Bring your new medications to your doctors follow up visit to discuss any changes that would be necessary. RETURN TO THE EMERGENCY DEPARTMENT IMMEDIATELY FOR ANY NEW OR WORSENING SYMPTOMS.    ADDITIONAL DISCHARGE INSTRUCTIONS:     - If you received IV contrast today with a CT or MRI please stay well hydrated for the next 48-72 hours to protect your kidneys.    - If you have been prescribed an antibiotic, taking an over the counter probiotic may help with  gastrointestinal side effects and antibiotic associated diarrhea.    - Return to the emergency department or seek immediate medical care if any of the following occur:           - Symptoms do not improve in 8-12 hours. If this occurs, please return to the emergency department for re-evaluation or your symptoms or repeat abdominal examination         - Symptoms worsen at any time.         - If you are unable to follow up with a medical provider as instructed.         - You develop any worrisome symptoms such as fever, chills, uncontrolled pain, change or worsening of your pain symptoms, intractable vomiting, uncontrolled diarrhea, blood in your stool, dark/tarry stool, new neurological symptoms etc.    If you have been prescribed a narcotic pain medication, please take a stool softener to prevent constipation.     During your ED visit, you may have been given narcotics (such as morphine, dilaudid, percocet, vicodin) or sedatives (such as ativan, versed). These medications can impair your reflexes so, DO NOT DRIVE or USE ANY MACHINERY for at least 6 hours if you have been given these medications.    REMINDER FOR METFORMIN USERS: If you are using metformin (also known as Glucophage) and if you received a CT scan in which you received IV contrast, you must hold your metformin for a total of 72 hrs.  This will prevent any adverse interactions between the two agents.

## 2025-06-18 ENCOUNTER — OFFICE VISIT (OUTPATIENT)
Dept: SURGERY | Facility: CLINIC | Age: 45
End: 2025-06-18
Payer: COMMERCIAL

## 2025-06-18 VITALS
DIASTOLIC BLOOD PRESSURE: 70 MMHG | BODY MASS INDEX: 25.44 KG/M2 | SYSTOLIC BLOOD PRESSURE: 110 MMHG | WEIGHT: 149 LBS | HEIGHT: 64 IN

## 2025-06-18 DIAGNOSIS — R10.11 RUQ PAIN: Primary | ICD-10-CM

## 2025-06-18 RX ORDER — POLYETHYLENE GLYCOL 3350 17 G/17G
17 POWDER, FOR SOLUTION ORAL DAILY
Qty: 30 EACH | Refills: 1 | Status: SHIPPED | OUTPATIENT
Start: 2025-06-18

## 2025-06-18 NOTE — PROGRESS NOTES
Subjective   Lavelle Storey is a 45 y.o. male is being seen for consultation today at the request of Trisha Dahl APRN    Lavelle Storey is a 45 y.o. male with recent episode of right upper quadrant abdominal pain.  Patient was noted to have colitis of the transverse and ascending colon of uncertain etiology.  No previous colonoscopy reported.  No unintentional weight loss or change in bowel habits or stool caliber.  Patient has completed antibiotic therapy.  No tenderness palpation on examination.  History of Present Illness      Past Medical History:   Diagnosis Date    Abnormal ECG     Arthritis     Chronic kidney disease     Diabetes mellitus     Elevated cholesterol     GERD (gastroesophageal reflux disease)     Heart murmur 10/14/2021    Hyperlipidemia     Hypertension     Kidney stones        Family History   Problem Relation Age of Onset    Hypertension Mother     Heart disease Mother     Diabetes Mother     Hypertension Father     Heart disease Father     Diabetes Father     Arthritis Father     COPD Father     Hypertension Brother        Social History     Socioeconomic History    Marital status:    Tobacco Use    Smoking status: Never     Passive exposure: Never    Smokeless tobacco: Never   Vaping Use    Vaping status: Never Used   Substance and Sexual Activity    Alcohol use: Never    Drug use: Never    Sexual activity: Yes     Partners: Female     Birth control/protection: Condom       Past Surgical History:   Procedure Laterality Date    CYSTOSCOPY Left 11/13/2023    Procedure: CYSTOSCOPY URETERAL STONE EXTRACTION;  Surgeon: David Eldridge MD;  Location: [facility];  Service: Urology;  Laterality: Left;    KIDNEY STONE SURGERY  11/10/23    NO PAST SURGERIES      URETEROSCOPY LASER LITHOTRIPSY WITH STENT INSERTION Left 11/13/2023    Procedure: URETEROSCOPY LASER LITHOTRIPSY;  Surgeon: David Eldridge MD;  Location: [facility];  Service: Urology;  Laterality: Left;       Review  "of Systems   Constitutional:  Negative for activity change, appetite change, chills and fever.   HENT:  Negative for sore throat and trouble swallowing.    Eyes:  Negative for visual disturbance.   Respiratory:  Negative for cough and shortness of breath.    Cardiovascular:  Negative for chest pain and palpitations.   Gastrointestinal:  Negative for abdominal distention, abdominal pain, blood in stool, constipation, diarrhea, nausea and vomiting.   Endocrine: Negative for cold intolerance and heat intolerance.   Genitourinary:  Negative for dysuria.   Musculoskeletal:  Negative for joint swelling.   Skin:  Negative for color change, rash and wound.   Allergic/Immunologic: Negative for immunocompromised state.   Neurological:  Negative for dizziness, seizures, weakness and headaches.   Hematological:  Negative for adenopathy. Does not bruise/bleed easily.   Psychiatric/Behavioral:  Negative for agitation and confusion.        Results        /70   Ht 162.6 cm (64\")   Wt 67.6 kg (149 lb)   BMI 25.58 kg/m²   Objective   Physical Exam  Constitutional:       Appearance: He is well-developed.   HENT:      Head: Normocephalic and atraumatic.   Eyes:      Conjunctiva/sclera: Conjunctivae normal.      Pupils: Pupils are equal, round, and reactive to light.   Neck:      Thyroid: No thyromegaly.      Vascular: No JVD.      Trachea: No tracheal deviation.   Cardiovascular:      Rate and Rhythm: Normal rate and regular rhythm.      Heart sounds: No murmur heard.     No friction rub. No gallop.   Pulmonary:      Effort: Pulmonary effort is normal.      Breath sounds: Normal breath sounds.   Abdominal:      General: There is no distension.      Palpations: Abdomen is soft. There is no hepatomegaly or splenomegaly.      Tenderness: There is no abdominal tenderness.      Hernia: No hernia is present.   Musculoskeletal:         General: No deformity. Normal range of motion.      Cervical back: Neck supple.   Skin:     " General: Skin is warm and dry.   Neurological:      Mental Status: He is alert and oriented to person, place, and time.       Physical Exam      Assessment & Plan            Assessment   Diagnoses and all orders for this visit:    1. RUQ pain (Primary)  -     NM HIDA Scan With Pharmacological Intervention; Future    Other orders  -     polyethylene glycol (MiraLax) 17 g packet; Take 17 g by mouth Daily.  Dispense: 30 each; Refill: 1        Assessment & Plan      Lavelle Storey is a 45 y.o. male with right upper quadrant abdominal pain.  Patient had recent colitis which is likely source of the pain.  Patient will undergo HIDA scan and follow-up after imaging to discuss management which may include colonoscopy and/or cholecystectomy.    BMI is >= 25 and <30. (Overweight) The following options were offered after discussion;: weight loss educational material (shared in after visit summary)            This document has been electronically signed by Chalino Méndez MD   June 18, 2025 16:09 EDT    Patient or patient representative verbalized consent for the use of Ambient Listening during the visit with  Chalino Méndez MD for chart documentation. 6/18/2025  16:10 EDT

## 2025-07-10 ENCOUNTER — HOSPITAL ENCOUNTER (OUTPATIENT)
Dept: NUCLEAR MEDICINE | Facility: HOSPITAL | Age: 45
Discharge: HOME | End: 2025-07-10
Admitting: SURGERY
Payer: COMMERCIAL

## 2025-07-10 DIAGNOSIS — R10.11 RUQ PAIN: ICD-10-CM

## 2025-07-10 PROCEDURE — 78227 HEPATOBIL SYST IMAGE W/DRUG: CPT

## 2025-07-10 PROCEDURE — A9537 TC99M MEBROFENIN: HCPCS | Performed by: SURGERY

## 2025-07-10 PROCEDURE — 25010000002 SINCALIDE PER 5 MCG: Performed by: SURGERY

## 2025-07-10 PROCEDURE — 34310000005 TECHNETIUM TC 99M MEBROFENIN KIT: Performed by: SURGERY

## 2025-07-10 RX ORDER — KIT FOR THE PREPARATION OF TECHNETIUM TC 99M MEBROFENIN 45 MG/10ML
1 INJECTION, POWDER, LYOPHILIZED, FOR SOLUTION INTRAVENOUS
Status: COMPLETED | OUTPATIENT
Start: 2025-07-10 | End: 2025-07-10

## 2025-07-10 RX ORDER — SINCALIDE 5 UG/5ML
0.04 INJECTION, POWDER, LYOPHILIZED, FOR SOLUTION INTRAVENOUS
Status: COMPLETED | OUTPATIENT
Start: 2025-07-10 | End: 2025-07-10

## 2025-07-10 RX ADMIN — MEBROFENIN 1 DOSE: 45 INJECTION, POWDER, LYOPHILIZED, FOR SOLUTION INTRAVENOUS at 12:00

## 2025-07-10 RX ADMIN — SINCALIDE 2.7 MCG: 5 INJECTION, POWDER, LYOPHILIZED, FOR SOLUTION INTRAVENOUS at 12:50

## 2025-07-15 ENCOUNTER — OFFICE VISIT (OUTPATIENT)
Dept: SURGERY | Facility: CLINIC | Age: 45
End: 2025-07-15
Payer: COMMERCIAL

## 2025-07-15 VITALS — HEIGHT: 64 IN | BODY MASS INDEX: 25.44 KG/M2 | WEIGHT: 149 LBS

## 2025-07-15 DIAGNOSIS — R10.30 LOWER ABDOMINAL PAIN: Primary | ICD-10-CM

## 2025-07-15 PROCEDURE — 99212 OFFICE O/P EST SF 10 MIN: CPT | Performed by: SURGERY

## 2025-07-15 NOTE — PROGRESS NOTES
Subjective   Lavelle Storey is a 45 y.o. male  is here today for follow-up.         Lavelle Storey is a 45 y.o. male for follow-up for abdominal pain.  Patient has HIDA scan showing normal ejection fraction.  Symptoms have improved.  He will continue to follow his symptoms and record foods that exacerbate his symptomatology though there may be a component of gastroparesis or delayed gastric emptying due to his Mounjaro dosage which has been decreased.  This correlates with an improvement in symptoms.  History of Present Illness         Physical Exam  Nondistended  Physical Exam              Assessment     Diagnoses and all orders for this visit:    1. Lower abdominal pain (Primary)      Lavelle Storey is a 45 y.o. male improving abdominal pain.  No apparent biliary source noted.  Ejection fraction 45% on HIDA scan.  Symptoms improved with decrease Mounjaro dosage.  Follow-up as needed.  Assessment & Plan          This document has been electronically signed by Chalino Méndez MD   July 15, 2025 16:17 EDT

## (undated) DEVICE — GW ZIPWIRE STD/SHFT STR TPR/3CM .035IN 150CM

## (undated) DEVICE — FIBR LASR FLEXIVAPULSE365 HOLMIUM FLT/TP 1P/U

## (undated) DEVICE — NITINOL STONE RETRIEVAL BASKET: Brand: ZERO TIP

## (undated) DEVICE — COR CYSTO: Brand: MEDLINE INDUSTRIES, INC.